# Patient Record
Sex: FEMALE | Race: BLACK OR AFRICAN AMERICAN | Employment: UNEMPLOYED | URBAN - METROPOLITAN AREA
[De-identification: names, ages, dates, MRNs, and addresses within clinical notes are randomized per-mention and may not be internally consistent; named-entity substitution may affect disease eponyms.]

---

## 2017-08-16 PROBLEM — G44.209 MUSCLE CONTRACTION HEADACHE: Status: ACTIVE | Noted: 2017-08-16

## 2017-08-16 PROBLEM — M50.20 CERVICAL DISC DISPLACEMENT: Status: ACTIVE | Noted: 2017-08-16

## 2017-08-16 PROBLEM — M54.12 CERVICAL RADICULOPATHY: Status: ACTIVE | Noted: 2017-08-16

## 2017-10-24 PROBLEM — M48.02 SPINAL STENOSIS IN CERVICAL REGION: Status: ACTIVE | Noted: 2017-10-24

## 2017-10-24 PROBLEM — M25.78 CERVICAL OSTEOPHYTE: Status: ACTIVE | Noted: 2017-10-24

## 2018-01-31 PROBLEM — G45.0 VERTEBROBASILAR TIAS: Status: ACTIVE | Noted: 2018-01-31

## 2019-07-18 ENCOUNTER — INITIAL CONSULT (OUTPATIENT)
Dept: SURGERY | Age: 74
End: 2019-07-18
Payer: MEDICAID

## 2019-07-18 VITALS
WEIGHT: 147 LBS | SYSTOLIC BLOOD PRESSURE: 133 MMHG | DIASTOLIC BLOOD PRESSURE: 74 MMHG | TEMPERATURE: 98.5 F | HEART RATE: 70 BPM | HEIGHT: 61 IN | BODY MASS INDEX: 27.75 KG/M2

## 2019-07-18 DIAGNOSIS — R10.13 EPIGASTRIC PAIN: Primary | ICD-10-CM

## 2019-07-18 PROCEDURE — 99204 OFFICE O/P NEW MOD 45 MIN: CPT | Performed by: SURGERY

## 2019-07-18 RX ORDER — SULFAMETHOXAZOLE AND TRIMETHOPRIM 800; 160 MG/1; MG/1
1 TABLET ORAL 2 TIMES DAILY
COMMUNITY
End: 2021-10-26

## 2019-10-28 ENCOUNTER — TELEPHONE (OUTPATIENT)
Dept: SURGERY | Age: 74
End: 2019-10-28

## 2020-02-28 VITALS
HEART RATE: 79 BPM | DIASTOLIC BLOOD PRESSURE: 90 MMHG | HEIGHT: 62 IN | BODY MASS INDEX: 26.5 KG/M2 | WEIGHT: 144 LBS | SYSTOLIC BLOOD PRESSURE: 132 MMHG

## 2020-02-28 RX ORDER — DORZOLAMIDE HCL 20 MG/ML
1 SOLUTION/ DROPS OPHTHALMIC DAILY
COMMUNITY

## 2020-02-28 RX ORDER — LUBIPROSTONE 24 UG/1
24 CAPSULE, GELATIN COATED ORAL 2 TIMES DAILY WITH MEALS
COMMUNITY

## 2020-02-28 RX ORDER — PANTOPRAZOLE SODIUM 40 MG/1
40 TABLET, DELAYED RELEASE ORAL DAILY
COMMUNITY
End: 2020-12-15

## 2020-02-28 RX ORDER — TIMOLOL MALEATE 5 MG/ML
1 SOLUTION/ DROPS OPHTHALMIC DAILY
COMMUNITY

## 2020-02-28 RX ORDER — LATANOPROST 50 UG/ML
1 SOLUTION/ DROPS OPHTHALMIC NIGHTLY
COMMUNITY

## 2020-02-28 RX ORDER — ALBUTEROL SULFATE 90 UG/1
2 AEROSOL, METERED RESPIRATORY (INHALATION) EVERY 6 HOURS PRN
COMMUNITY

## 2020-02-28 RX ORDER — BRIMONIDINE TARTRATE 2 MG/ML
1 SOLUTION/ DROPS OPHTHALMIC 2 TIMES DAILY
COMMUNITY

## 2020-08-18 RX ORDER — METOPROLOL SUCCINATE 25 MG/1
25 TABLET, EXTENDED RELEASE ORAL DAILY
Qty: 30 TABLET | Refills: 2 | Status: SHIPPED
Start: 2020-08-18 | End: 2020-11-09 | Stop reason: SDUPTHER

## 2020-11-09 RX ORDER — METOPROLOL SUCCINATE 25 MG/1
25 TABLET, EXTENDED RELEASE ORAL DAILY
Qty: 90 TABLET | Refills: 3 | Status: SHIPPED
Start: 2020-11-09 | End: 2022-06-27

## 2020-11-09 RX ORDER — LISINOPRIL 2.5 MG/1
10 TABLET ORAL DAILY
Qty: 90 TABLET | Refills: 3 | Status: SHIPPED
Start: 2020-11-09 | End: 2021-09-01

## 2020-12-10 NOTE — PROGRESS NOTES
Bromide (INCRUSE ELLIPTA) 62.5 MCG/INH AEPB Inhale 62.5 mcg into the lungs daily      timolol (TIMOPTIC) 0.5 % ophthalmic solution Place 1 drop into both eyes daily      brimonidine (ALPHAGAN) 0.2 % ophthalmic solution Place 1 drop into both eyes 2 times daily      dorzolamide (TRUSOPT) 2 % ophthalmic solution Place 1 drop into both eyes daily      albuterol sulfate  (90 Base) MCG/ACT inhaler Inhale 2 puffs into the lungs every 6 hours as needed for Wheezing      latanoprost (XALATAN) 0.005 % ophthalmic solution Place 1 drop into both eyes nightly      lubiprostone (AMITIZA) 24 MCG capsule Take 24 mcg by mouth 2 times daily (with meals)      sulfamethoxazole-trimethoprim (BACTRIM DS;SEPTRA DS) 800-160 MG per tablet Take 1 tablet by mouth 2 times daily      MAPAP 325 MG tablet TK 1 T PO Q 6 H PRN  0    aspirin (ECOTRIN LOW STRENGTH) 81 MG EC tablet Take 1 tablet by mouth 2 times daily 60 tablet 5    ranitidine (ZANTAC) 150 MG tablet TK 1 T PO  BID  0    tiotropium (SPIRIVA HANDIHALER) 18 MCG inhalation capsule Inhale 18 mcg into the lungs every morning.  salmeterol (SEREVENT DISKUS) 50 MCG/DOSE diskus inhaler Inhale 1 puff into the lungs 2 times daily. No current facility-administered medications for this visit.           Allergies as of 12/15/2020 - Review Complete 12/15/2020   Allergen Reaction Noted    Pcn [penicillins] Itching and Swelling 03/26/2012       Social History     Socioeconomic History    Marital status: Single     Spouse name: Not on file    Number of children: Not on file    Years of education: Not on file    Highest education level: Not on file   Occupational History    Not on file   Social Needs    Financial resource strain: Not on file    Food insecurity     Worry: Not on file     Inability: Not on file    Transportation needs     Medical: Not on file     Non-medical: Not on file   Tobacco Use    Smoking status: Current Some Day Smoker     Packs/day: 0.25    Smokeless tobacco: Never Used   Substance and Sexual Activity    Alcohol use: Yes     Comment: every weekend beer 6 pack     Drug use: No    Sexual activity: Not on file   Lifestyle    Physical activity     Days per week: Not on file     Minutes per session: Not on file    Stress: Not on file   Relationships    Social connections     Talks on phone: Not on file     Gets together: Not on file     Attends Rastafari service: Not on file     Active member of club or organization: Not on file     Attends meetings of clubs or organizations: Not on file     Relationship status: Not on file    Intimate partner violence     Fear of current or ex partner: Not on file     Emotionally abused: Not on file     Physically abused: Not on file     Forced sexual activity: Not on file   Other Topics Concern    Not on file   Social History Narrative    Not on file       Family History   Problem Relation Age of Onset    Cancer Mother        REVIEW OF SYSTEMS:     CONSTITUTIONAL:  negative for  fevers, chills, sweats, + fatigue  HEENT:  negative for  tinnitus, earaches, nasal congestion and epistaxis  RESPIRATORY:  negative for  dry cough, cough with sputum, wheezing and hemoptysis  GASTROINTESTINAL:  negative for nausea, vomiting, diarrhea, constipation, pruritus and jaundice  HEMATOLOGIC/LYMPHATIC:  negative for easy bruising, bleeding, lymphadenopathy and petechiae  ENDOCRINE:  negative for heat intolerance, cold intolerance, tremor, hair loss and diabetic symptoms including neither polyuria nor polydipsia nor blurred vision  MUSCULOSKELETAL:  negative for  myalgias, arthralgias, joint swelling, stiff joints and decreased range of motion  NEUROLOGICAL:  negative for memory problems, speech problems, visual disturbance, dysphagia, weakness and numbness      PHYSICAL EXAM:   Constitutional:  Awake, alert cooperative, no apparent distress, and appears stated age.    HEENT:  Moist and pink mucous membranes, normocephalic, without obvious abnormality, atraumatic, normal ears and nose. Neck:  Supple, symmetrical, trachea midline, no JVD, no adenopathy, thyroid symmetric, not enlarged and no tenderness, good carotid upstroke bilaterally, no carotid bruit, skin normal  Lungs: No increased work of breathing, good air exchange, clear to auscultation bilaterally, no crackles or wheezing. Cardiovascular: Normal apical impulse, regular rate and rhythm, normal S1 and S2, no S3 or S4, 3/6 systolic murmur at the apex, 3/6 stock murmur at the left lower sternal border, no pedal edema, good carotid upstroke bilaterally, no carotid bruit, no JVD, no abdominal pulsating masses. Abdomen: Soft, nontender, no hepatomegaly, no splenomegaly, bowel sound positive. CHEST:  Expands symmetrically, nontender to palpation. Musculoskeletal:  No clubbing or cyanosis. No redness, warmth, or swelling of the joints. Neurological: Alert, awake, and oriented X3. /70 (Site: Right Upper Arm, Position: Sitting, Cuff Size: Medium Adult)   Pulse 92   Ht 5' 1.5\" (1.562 m)   Wt 150 lb (68 kg)   BMI 27.88 kg/m²     DATA:   I personally reviewed the visit EKG with the following interpretation: Sinus rhythm    EKG 8/30/2019) I personally reviewed the EKG with the following interpretation: Sinus rhythm    ECHO: 9/11/18 Moderate mitral and tricuspid regurgitation. Mild to moderate pulmonary insufficiency. Moderate sclerosis of the aortic without stenosis Mild asymmetric septal hypertrophy Left ventricular diastolic dysfunction EF 72%    Stress Test: 8/4/17 1. Negative Lexiscan stress test for ischemic symptoms or ischemic EKG changes  2. Normal Cardiolite perfusion scan without evidence of fixed or reversible defect  3. Normal left ventricular systolic function with normal wall motion  4. There is no comparison study  5.   The results of the study predict a low probability for significant coronary artery disease or future cardiac events    Angiography: Cardiology Labs: BMP:    Lab Results   Component Value Date     05/28/2014    K 4.7 05/28/2014     05/28/2014    CO2 23 05/28/2014    BUN 11 05/28/2014    CREATININE 0.9 05/28/2014     CMP:    Lab Results   Component Value Date     05/28/2014    K 4.7 05/28/2014     05/28/2014    CO2 23 05/28/2014    BUN 11 05/28/2014    CREATININE 0.9 05/28/2014    PROT 7.0 08/06/2014     CBC:    Lab Results   Component Value Date    WBC 5.2 08/06/2014    RBC 3.68 08/06/2014    HGB 12.5 08/06/2014    HCT 36.2 08/06/2014    MCV 98.4 08/06/2014    RDW 13.3 08/06/2014     08/06/2014     PT/INR:  No results found for: PTINR  PT/INR Warfarin:  No components found for: PTPATWAR, PTINRWAR  PTT:    Lab Results   Component Value Date    APTT 24.9 08/06/2014     PTT Heparin:  No components found for: APTTHEP  Magnesium:  No results found for: MG  TSH:    Lab Results   Component Value Date    TSH 0.532 05/28/2014     TROPONIN:  No components found for: TROP  BNP:  No results found for: BNP  FASTING LIPID PANEL:    Lab Results   Component Value Date    CHOL 161 04/08/2011    HDL 66.0 04/08/2011    TRIG 95 04/08/2011     NM Cardiac Stress Test Nuclear Imaging    (Results Pending)     I have personally reviewed the laboratory, cardiac diagnostic and radiographic testing as outlined above:      IMPRESSION:  1: Exertional chest pain or shortness of breath: No recent functional ischemic evaluation, will schedule for Lexiscan stress test         2:  Essential (primary) hypertension: Controlled, continue current treatment. 3: Nonrheumatic mitral (valve) insufficiency: Moderate    4: Tricuspid valve regurgitation: Moderate                5: Tobacco abuse patient was counseled regarding smoking cessation                     RECOMMENDATIONS:   1. Lexiscan stress test to rule out CAD  2. Will need echocardiogram if stress test is negative for ischemia  3.   Patient was strongly advised to call 911 if symptoms recur or get worse for any reason  4. Continue current treatment  5. Follow-up with Dr. Etta Brewer as scheduled  6. Follow-up with Dr. Soila Cardozo after her test    I have reviewed my findings and recommendations with patient    Electronically signed by Madhavi Gardiner MD on 12/15/2020 at 1:42 PM    NOTE: This report was transcribed using voice recognition software.  Every effort was made to ensure accuracy; however, inadvertent computerized transcription errors may be present

## 2020-12-15 ENCOUNTER — OFFICE VISIT (OUTPATIENT)
Dept: CARDIOLOGY CLINIC | Age: 75
End: 2020-12-15
Payer: MEDICAID

## 2020-12-15 VITALS
HEIGHT: 62 IN | WEIGHT: 150 LBS | SYSTOLIC BLOOD PRESSURE: 120 MMHG | HEART RATE: 92 BPM | DIASTOLIC BLOOD PRESSURE: 70 MMHG | BODY MASS INDEX: 27.6 KG/M2

## 2020-12-15 PROCEDURE — 99214 OFFICE O/P EST MOD 30 MIN: CPT | Performed by: INTERNAL MEDICINE

## 2020-12-15 PROCEDURE — 93000 ELECTROCARDIOGRAM COMPLETE: CPT | Performed by: INTERNAL MEDICINE

## 2020-12-15 RX ORDER — AMLODIPINE BESYLATE 5 MG/1
TABLET ORAL
COMMUNITY
Start: 2020-11-10 | End: 2020-12-22 | Stop reason: SDUPTHER

## 2020-12-15 RX ORDER — ATORVASTATIN CALCIUM 10 MG/1
TABLET, FILM COATED ORAL
COMMUNITY
Start: 2020-11-10 | End: 2020-12-22 | Stop reason: SDUPTHER

## 2020-12-21 ENCOUNTER — TELEPHONE (OUTPATIENT)
Dept: CARDIOLOGY | Age: 75
End: 2020-12-21

## 2020-12-21 NOTE — TELEPHONE ENCOUNTER
Spoke with patient reminder of appointment on 12/22/20 at 9:30 for a lexiscan stress test instructions and COVID checklist reviewed patient confirmed appointment.

## 2020-12-22 ENCOUNTER — HOSPITAL ENCOUNTER (OUTPATIENT)
Dept: CARDIOLOGY | Age: 75
Discharge: HOME | End: 2020-12-22
Payer: MEDICAID

## 2020-12-22 VITALS
WEIGHT: 150 LBS | DIASTOLIC BLOOD PRESSURE: 81 MMHG | BODY MASS INDEX: 27.6 KG/M2 | TEMPERATURE: 95.5 F | HEIGHT: 62 IN | SYSTOLIC BLOOD PRESSURE: 149 MMHG | RESPIRATION RATE: 18 BRPM | HEART RATE: 68 BPM

## 2020-12-22 PROCEDURE — 2580000003 HC RX 258: Performed by: INTERNAL MEDICINE

## 2020-12-22 PROCEDURE — A9502 TC99M TETROFOSMIN: HCPCS | Performed by: INTERNAL MEDICINE

## 2020-12-22 PROCEDURE — 6360000002 HC RX W HCPCS: Performed by: INTERNAL MEDICINE

## 2020-12-22 PROCEDURE — 78452 HT MUSCLE IMAGE SPECT MULT: CPT

## 2020-12-22 PROCEDURE — 93017 CV STRESS TEST TRACING ONLY: CPT

## 2020-12-22 PROCEDURE — 3430000000 HC RX DIAGNOSTIC RADIOPHARMACEUTICAL: Performed by: INTERNAL MEDICINE

## 2020-12-22 RX ORDER — ATORVASTATIN CALCIUM 10 MG/1
TABLET, FILM COATED ORAL
Qty: 90 TABLET | Refills: 3 | Status: SHIPPED | OUTPATIENT
Start: 2020-12-22

## 2020-12-22 RX ORDER — SODIUM CHLORIDE 0.9 % (FLUSH) 0.9 %
10 SYRINGE (ML) INJECTION PRN
Status: DISCONTINUED | OUTPATIENT
Start: 2020-12-22 | End: 2020-12-23 | Stop reason: HOSPADM

## 2020-12-22 RX ORDER — AMLODIPINE BESYLATE 5 MG/1
TABLET ORAL
Qty: 90 TABLET | Refills: 3 | Status: SHIPPED
Start: 2020-12-22 | End: 2022-08-26 | Stop reason: SDUPTHER

## 2020-12-22 RX ORDER — ASPIRIN 81 MG/1
81 TABLET ORAL 2 TIMES DAILY
Qty: 180 TABLET | Refills: 3 | Status: SHIPPED
Start: 2020-12-22 | End: 2022-07-18

## 2020-12-22 RX ADMIN — SODIUM CHLORIDE, PRESERVATIVE FREE 10 ML: 5 INJECTION INTRAVENOUS at 09:41

## 2020-12-22 RX ADMIN — TETROFOSMIN 34 MILLICURIE: 0.23 INJECTION, POWDER, LYOPHILIZED, FOR SOLUTION INTRAVENOUS at 11:19

## 2020-12-22 RX ADMIN — TETROFOSMIN 11 MILLICURIE: 0.23 INJECTION, POWDER, LYOPHILIZED, FOR SOLUTION INTRAVENOUS at 09:41

## 2020-12-22 RX ADMIN — SODIUM CHLORIDE, PRESERVATIVE FREE 10 ML: 5 INJECTION INTRAVENOUS at 11:19

## 2020-12-22 RX ADMIN — REGADENOSON 0.4 MG: 0.08 INJECTION, SOLUTION INTRAVENOUS at 11:20

## 2020-12-22 NOTE — PROCEDURES
[address] [facility]. Deisy Benedict [address] [place] [address]  [phone]                 Pharmacologic Stress Nuclear Gated SPECT Study    Name: Duong WORK[address] Account Number: [de-identified]    :  1945          Sex: female         Date of Study:  2020    Height: 5' 2\" (157.5 cm)         Weight: 150 lb (68 kg)     Ordering Provider: Pedro Hollins          PCP: Yarelis Espana MD      Cardiologist: Pedro Hollins             Interpreting Physician: Pedro Hollins  _________________________________________________________________________________    Indication:   Detecting the presence and location of coronary artery disease    Clinical History:   Patient has no known history of coronary artery disease. Resting ECG:    MN int 154m sec, QRS int 68m sec, QT int 412m sec; HR 68 bpm  Normal sinus rhythm    Procedure:   Pharmacologic stress testing was performed with regadenoson 0.4 mg for 15 seconds. The heart rate was 68 at baseline and lydia to 102 beats during the infusion. The blood pressure at baseline was 149/81 and blood pressure at the end of infusion was 154/84. Blood pressure response was normal during the stress procedure. The patient tolerated the infusion well. ECG during the infusion did not change. IMAGING: Myocardial perfusion imaging was performed at rest 30-35 minutes following the intravenous injection of 11 mCi of (Tc-tetrofosmin) followed by 10 ml of Normal Saline. As per infusion protocol, the patient was injected intravenously with 34 mCi of (Tc-tetrofosmin) followed by 10 ml of Normal Saline. Gated post-stress tomographic imaging was performed 45 minutes after stress. FINDINGS: The overall quality of the study was good. Left ventricular cavity size was noted to be normal.    Rotational analog analysis demonstrated no patient motion or abnormal extracardiac radioactivity. The gated SPECT stress imaging in the short, vertical long, and horizontal long axis demonstrated normal homogeneous tracer distribution throughout the myocardium both on post regadenoson and resting images. Gated SPECT left ventricular ejection fraction was calculated to be 75 %, with normal myocardial thickening and wall motion. Impression:    1. Electrocardiographically normal regadenoson infusion with a clinically non-ischemic response  2. Myocardial perfusion imaging was normal.    3. Overall left ventricular systolic function was normal without regional wall motion abnormalities. 4. Low risk general pharmacologic stress test.    Thank you for sending your patient to this [facility] [facility].      Electronically signed by Alexei Shin MD on 12/23/20 at 6:21 PM EST

## 2020-12-23 PROCEDURE — 93016 CV STRESS TEST SUPVJ ONLY: CPT | Performed by: INTERNAL MEDICINE

## 2020-12-23 PROCEDURE — 78452 HT MUSCLE IMAGE SPECT MULT: CPT | Performed by: INTERNAL MEDICINE

## 2020-12-23 PROCEDURE — 93018 CV STRESS TEST I&R ONLY: CPT | Performed by: INTERNAL MEDICINE

## 2020-12-29 ENCOUNTER — TELEPHONE (OUTPATIENT)
Dept: CARDIOLOGY CLINIC | Age: 75
End: 2020-12-29

## 2021-09-01 RX ORDER — LISINOPRIL 2.5 MG/1
TABLET ORAL
Qty: 90 TABLET | Refills: 3 | Status: SHIPPED
Start: 2021-09-01 | End: 2022-06-27

## 2021-10-25 NOTE — PROGRESS NOTES
[facility] Cardiology Progress Note  Dr. Dola Siemens      Referring Physician: John Washburn MD  CHIEF COMPLAINT:   Chief Complaint   Patient presents with    Hypertension     Annual.  Patient states she has been having SOB on exertion       HISTORY OF PRESENT ILLNESS:   Mrs Tova Lazaro is a 77year old female with history of SOB and palpitations who is here for follow-up appointment  Occasional lightheadedness and dizziness, mostly orthostatic, sharp left-sided chest pain, comes and goes, shortness of breath is at baseline, denies any pedal edema, no PND, no orthopnea, no syncope, no presyncopal episodes.     Functional capacity is limited due to shortness of breath      Past Medical History:   Diagnosis Date    Asthma     Back pain     Cancer ([facility] [address]     breast in remission    Cigarette nicotine dependence     GERD (gastroesophageal reflux disease)     Headache(784.0)     Headache(784.0)     Hypertension     Liver disease     hepatitis c     Lumbar radiculopathy     Neck pain     Neuropathy, peripheral, idiopathic     Nonrheumatic mitral (valve) insufficiency          Past Surgical History:   Procedure Laterality Date    BREAST LUMPECTOMY      ERCP  8/6/2014    unsuccessful    HYSTERECTOMY      INNER EAR SURGERY      MASTECTOMY Left 2000    SHOULDER SURGERY           Current Outpatient Medications   Medication Sig Dispense Refill    pantoprazole (PROTONIX) 40 MG tablet TAKE 1 TABLET BY MOUTH EVERY DAY      lisinopril (PRINIVIL;ZESTRIL) 2.5 MG tablet TAKE 4 TABLETS BY MOUTH EVERY DAY 90 tablet 3    aspirin (ECOTRIN LOW STRENGTH) 81 MG EC tablet Take 1 tablet by mouth 2 times daily 180 tablet 3    amLODIPine (NORVASC) 5 MG tablet TK 1 T PO QD 90 tablet 3    atorvastatin (LIPITOR) 10 MG tablet TK 1 T PO QD 90 tablet 3    metoprolol succinate (TOPROL XL) 25 MG extended release tablet Take 1 tablet by mouth daily 90 tablet 3    timolol (TIMOPTIC) 0.5 % ophthalmic solution Place 1 drop into both eyes daily      brimonidine (ALPHAGAN) 0.2 % ophthalmic solution Place 1 drop into both eyes 2 times daily      dorzolamide (TRUSOPT) 2 % ophthalmic solution Place 1 drop into both eyes daily      albuterol sulfate  (90 Base) MCG/ACT inhaler Inhale 2 puffs into the lungs every 6 hours as needed for Wheezing      latanoprost (XALATAN) 0.005 % ophthalmic solution Place 1 drop into both eyes nightly      lubiprostone (AMITIZA) 24 MCG capsule Take 24 mcg by mouth 2 times daily (with meals)      MAPAP 325 MG tablet TK 1 T PO Q 6 H PRN  0    tiotropium (SPIRIVA HANDIHALER) 18 MCG inhalation capsule Inhale 18 mcg into the lungs every morning.  salmeterol (SEREVENT DISKUS) 50 MCG/DOSE diskus inhaler Inhale 1 puff into the lungs 2 times daily.  dicyclomine (BENTYL) 10 MG capsule Take 10 mg by mouth 4 times daily (before meals and nightly)      albuterol sulfate HFA (PROAIR HFA) 108 (90 Base) MCG/ACT inhaler Inhale 2 puffs into the lungs every 6 hours as needed for Wheezing 18 g 0     No current facility-administered medications for this visit.          Allergies as of 10/26/2021 - Fully Reviewed 10/26/2021   Allergen Reaction Noted    Pcn [penicillins] Itching and Swelling 03/26/2012       Social History     Socioeconomic History    Marital status: Single     Spouse name: Not on file    Number of children: Not on file    Years of education: Not on file    Highest education level: Not on file   Occupational History    Not on file   Tobacco Use    Smoking status: Former Smoker     Packs/day: 0.25    Smokeless tobacco: Never Used   Vaping Use    Vaping Use: Never used   Substance and Sexual Activity    Alcohol use: Yes     Comment: every weekend beer 6 pack     Drug use: No    Sexual activity: Not on file   Other Topics Concern    Not on file   Social History Narrative    Not on file     Social Determinants of Health     Financial Resource Strain:     Difficulty of Paying Living Expenses: Not on file   Food Insecurity:     Worried About [facility] in the Last Year: Not on file    Victorino of Food in the Last Year: Not on file   Transportation Needs:     Lack of Transportation (Medical): Not on file    Lack of Transportation (Non-Medical):  Not on file   Physical Activity:     Days of Exercise per Week: Not on file    Minutes of Exercise per Session: Not on file   Stress:     Feeling of Stress : Not on file   Social Connections:     Frequency of Communication with Friends and Family: Not on file    Frequency of Social Gatherings with Friends and Family: Not on file    Attends Rastafari Services: Not on file    Active Member of [address] [place] [address] or Organizations: Not on file    Attends Club or Organization Meetings: Not on file    Marital Status: Not on file   Intimate Partner Violence:     Fear of Current or Ex-Partner: Not on file    Emotionally Abused: Not on file    Physically Abused: Not on file    Sexually Abused: Not on file   Housing Stability:     Unable to Pay for Housing in the Last Year: Not on file    Number of [place] in the Last Year: Not on file    Unstable Housing in the Last Year: Not on file       Family History   Problem Relation Age of Onset    Cancer Mother     No Known Problems Father        REVIEW OF SYSTEMS:     CONSTITUTIONAL:  negative for  fevers, chills, sweats, + fatigue  HEENT:  negative for  tinnitus, earaches, nasal congestion and epistaxis  RESPIRATORY:  negative for  dry cough, cough with sputum, wheezing and hemoptysis  GASTROINTESTINAL:  negative for nausea, vomiting, diarrhea, constipation, pruritus and jaundice  HEMATOLOGIC/LYMPHATIC:  negative for easy bruising, bleeding, lymphadenopathy and petechiae  ENDOCRINE:  negative for heat intolerance, cold intolerance, tremor, hair loss and diabetic symptoms including neither polyuria nor polydipsia nor blurred vision  MUSCULOSKELETAL:  negative for  myalgias, arthralgias, joint swelling, stiff joints and decreased range of motion  NEUROLOGICAL:  negative for memory problems, speech problems, visual disturbance, dysphagia, weakness and numbness      PHYSICAL EXAM:   Constitutional:  Awake, alert cooperative, no apparent distress, and appears stated age. HEENT:  Moist and pink mucous membranes, normocephalic, without obvious abnormality, atraumatic, normal ears and nose. Neck:  Supple, symmetrical, trachea midline, no JVD, no adenopathy, thyroid symmetric, not enlarged and no tenderness, good carotid upstroke bilaterally, no carotid bruit, skin normal  Lungs: No increased work of breathing, good air exchange, clear to auscultation bilaterally, no crackles or wheezing. Cardiovascular: Normal apical impulse, regular rate and rhythm, normal S1 and S2, no S3 or S4, 3/6 systolic murmur at the apex, 3/6 stock murmur at the left lower sternal border, no pedal edema, good carotid upstroke bilaterally, no carotid bruit, no JVD, no abdominal pulsating masses. Abdomen: Soft, nontender, no hepatomegaly, no splenomegaly, bowel sound positive. CHEST:  Expands symmetrically, nontender to palpation. Musculoskeletal:  No clubbing or cyanosis. No redness, warmth, or swelling of the joints. Neurological: Alert, awake, and oriented X3. /84 (Site: Right Upper Arm, Position: Sitting, Cuff Size: Medium Adult)   Pulse 95   Ht 5' 2\" (1.575 m)   Wt 155 lb (70.3 kg)   BMI 28.35 kg/m²     DATA:   I personally reviewed the visit EKG with the following interpretation: Sinus rhythm, normal axis, no change when compared to previous    EKG 8/30/2019) I personally reviewed the EKG with the following interpretation:    EKG 12/15/20  Sinus rhythm    ECHO: 9/11/18 Moderate mitral and tricuspid regurgitation. Mild to moderate pulmonary insufficiency.   Moderate sclerosis of the aortic without stenosis Mild asymmetric septal hypertrophy Left ventricular diastolic dysfunction EF 27%    Stress Test: 12/22/20 Impression:   1. Electrocardiographically normal regadenoson infusion with a   clinically non-ischemic response   2. Myocardial perfusion imaging was normal.     3. Overall left ventricular systolic function was normal without   regional wall motion abnormalities. 4.   Low risk general pharmacologic stress test     8/4/17 1. Negative Lexiscan stress test for ischemic symptoms or ischemic EKG changes  2. Normal Cardiolite perfusion scan without evidence of fixed or reversible defect  3. Normal left ventricular systolic function with normal wall motion  4. There is no comparison study  5.   The results of the study predict a low probability for significant coronary artery disease or future cardiac events    Angiography:     Cardiology Labs: BMP:    Lab Results   Component Value Date     05/28/2014    K 4.7 05/28/2014     05/28/2014    CO2 23 05/28/2014    BUN 11 05/28/2014    CREATININE 0.9 05/28/2014     CMP:    Lab Results   Component Value Date     05/28/2014    K 4.7 05/28/2014     05/28/2014    CO2 23 05/28/2014    BUN 11 05/28/2014    CREATININE 0.9 05/28/2014    PROT 7.0 08/06/2014     CBC:    Lab Results   Component Value Date    WBC 5.2 08/06/2014    RBC 3.68 08/06/2014    HGB 12.5 08/06/2014    HCT 36.2 08/06/2014    MCV 98.4 08/06/2014    RDW 13.3 08/06/2014     08/06/2014     PT/INR:  No results found for: PTINR  PT/INR Warfarin:  No components found for: PTPATWAR, PTINRWAR  PTT:    Lab Results   Component Value Date    APTT 24.9 08/06/2014     PTT Heparin:  No components found for: APTTHEP  Magnesium:  No results found for: MG  TSH:    Lab Results   Component Value Date    TSH 0.532 05/28/2014     TROPONIN:  No components found for: TROP  BNP:  No results found for: BNP  FASTING LIPID PANEL:    Lab Results   Component Value Date    CHOL 161 04/08/2011    HDL 66.0 04/08/2011    TRIG 95 04/08/2011     No orders to display     I have personally reviewed the laboratory, cardiac diagnostic and radiographic testing as outlined above:      IMPRESSION:  1: Exertional chest pain or shortness of breath: Chronic, had negative Lexiscan stress test in December 2020 for the same symptoms  2:  Essential (primary) hypertension: Controlled, continue current treatment. 3: Nonrheumatic mitral (valve) insufficiency: Moderate  4: Tricuspid valve regurgitation: Moderate       5: Tobacco abuse: Patient was counseled regarding smoking cessation                     RECOMMENDATIONS:   1. Continue current treatment  2. Patient was strongly advised to call 911 if symptoms recur or get worse for any reason  3. Continue current treatment  4. Follow-up with Dr. Roverto Lai as scheduled  5. Follow-up with Dr. Lani Webber in 1 year, sooner if symptomatic for any reason    I have reviewed my findings and recommendations with patient    Electronically signed by Hugo Abernathy MD on 12/13/2021 at 7:19 PM    NOTE: This report was transcribed using voice recognition software.  Every effort was made to ensure accuracy; however, inadvertent computerized transcription errors may be present

## 2021-10-26 ENCOUNTER — OFFICE VISIT (OUTPATIENT)
Dept: CARDIOLOGY CLINIC | Age: 76
End: 2021-10-26
Payer: MEDICAID

## 2021-10-26 VITALS
SYSTOLIC BLOOD PRESSURE: 124 MMHG | BODY MASS INDEX: 28.52 KG/M2 | HEIGHT: 62 IN | HEART RATE: 95 BPM | WEIGHT: 155 LBS | DIASTOLIC BLOOD PRESSURE: 84 MMHG

## 2021-10-26 DIAGNOSIS — I10 ESSENTIAL HYPERTENSION: Primary | ICD-10-CM

## 2021-10-26 PROCEDURE — 99214 OFFICE O/P EST MOD 30 MIN: CPT | Performed by: INTERNAL MEDICINE

## 2021-10-26 PROCEDURE — 93000 ELECTROCARDIOGRAM COMPLETE: CPT | Performed by: INTERNAL MEDICINE

## 2021-10-26 RX ORDER — PANTOPRAZOLE SODIUM 40 MG/1
TABLET, DELAYED RELEASE ORAL
COMMUNITY
Start: 2021-09-29

## 2021-12-01 ENCOUNTER — HOSPITAL ENCOUNTER (EMERGENCY)
Age: 76
Discharge: HOME | End: 2021-12-01
Attending: EMERGENCY MEDICINE
Payer: MEDICAID

## 2021-12-01 ENCOUNTER — APPOINTMENT (OUTPATIENT)
Dept: GENERAL RADIOLOGY | Age: 76
End: 2021-12-01
Payer: MEDICAID

## 2021-12-01 VITALS
BODY MASS INDEX: 28.32 KG/M2 | HEART RATE: 91 BPM | WEIGHT: 150 LBS | OXYGEN SATURATION: 97 % | HEIGHT: 61 IN | SYSTOLIC BLOOD PRESSURE: 140 MMHG | RESPIRATION RATE: 20 BRPM | TEMPERATURE: 98.1 F | DIASTOLIC BLOOD PRESSURE: 89 MMHG

## 2021-12-01 DIAGNOSIS — J45.909 UNCOMPLICATED ASTHMA, UNSPECIFIED ASTHMA SEVERITY, UNSPECIFIED WHETHER PERSISTENT: ICD-10-CM

## 2021-12-01 DIAGNOSIS — U07.1 COVID-19: Primary | ICD-10-CM

## 2021-12-01 DIAGNOSIS — R06.00 DYSPNEA DUE TO COVID-19: ICD-10-CM

## 2021-12-01 DIAGNOSIS — R91.1 PULMONARY NODULE: ICD-10-CM

## 2021-12-01 DIAGNOSIS — U07.1 DYSPNEA DUE TO COVID-19: ICD-10-CM

## 2021-12-01 LAB — SARS-COV-2, NAAT: DETECTED

## 2021-12-01 PROCEDURE — 71045 X-RAY EXAM CHEST 1 VIEW: CPT

## 2021-12-01 PROCEDURE — 99283 EMERGENCY DEPT VISIT LOW MDM: CPT

## 2021-12-01 PROCEDURE — 87635 SARS-COV-2 COVID-19 AMP PRB: CPT

## 2021-12-01 RX ORDER — DEXAMETHASONE 6 MG/1
6 TABLET ORAL 2 TIMES DAILY WITH MEALS
Qty: 8 TABLET | Refills: 0 | Status: SHIPPED | OUTPATIENT
Start: 2021-12-01 | End: 2021-12-05

## 2021-12-01 RX ORDER — DICYCLOMINE HYDROCHLORIDE 10 MG/1
10 CAPSULE ORAL
COMMUNITY
End: 2022-07-18

## 2021-12-01 RX ORDER — ALBUTEROL SULFATE 90 UG/1
2 AEROSOL, METERED RESPIRATORY (INHALATION) EVERY 6 HOURS PRN
Qty: 18 G | Refills: 0 | Status: SHIPPED | OUTPATIENT
Start: 2021-12-01 | End: 2021-12-08

## 2021-12-01 ASSESSMENT — ENCOUNTER SYMPTOMS
WHEEZING: 0
EYE PAIN: 0
ABDOMINAL PAIN: 0
SINUS PRESSURE: 0
RHINORRHEA: 1
ABDOMINAL DISTENTION: 0
EYE REDNESS: 0
NAUSEA: 0
SORE THROAT: 0
COUGH: 1
EYE DISCHARGE: 0
VOMITING: 0
WHEEZING: 1
CHEST TIGHTNESS: 0
DIARRHEA: 0
SHORTNESS OF BREATH: 0
BACK PAIN: 0

## 2021-12-01 NOTE — ED NOTES
EMT ambulance here to transport pt to [facility] Report given to EMS Pt stable 02 cont via NC Pt AOX3 Pt notified sister of possible   Admission      Adonis Vallejo RN  12/01/21 Lux Myers RN  12/01/21 [id number]

## 2021-12-01 NOTE — ED PROVIDER NOTES
The history is provided by the patient. Illness   The current episode started more than 1 week ago. The onset was gradual. The problem is moderate. Associated symptoms include congestion, headaches, rhinorrhea, muscle aches, cough and wheezing. Pertinent negatives include no fever, no diarrhea, no nausea, no vomiting, no ear pain, no sore throat, no rash, no eye discharge, no eye pain and no eye redness. Review of Systems   Constitutional: Negative for chills and fever. HENT: Positive for congestion and rhinorrhea. Negative for ear pain, sinus pressure and sore throat. Eyes: Negative for pain, discharge and redness. Respiratory: Positive for cough and wheezing. Negative for shortness of breath. Cardiovascular: Negative for chest pain. Gastrointestinal: Negative for abdominal distention, diarrhea, nausea and vomiting. Genitourinary: Negative for dysuria and frequency. Musculoskeletal: Negative for arthralgias and back pain. Skin: Negative for rash and wound. Neurological: Positive for headaches. Negative for weakness. Hematological: Negative for adenopathy. All other systems reviewed and are negative. Physical Exam  Vitals and nursing note reviewed. Constitutional:       Appearance: She is well-developed. HENT:      Head: Normocephalic and atraumatic. Right Ear: Hearing and external ear normal. Tympanic membrane is retracted. Left Ear: Hearing and external ear normal. Tympanic membrane is retracted. Nose: Mucosal edema and congestion present. Mouth/Throat:      Pharynx: Uvula midline. Eyes:      General: Lids are normal.      Conjunctiva/sclera: Conjunctivae normal.      Pupils: Pupils are equal, round, and reactive to light. Cardiovascular:      Rate and Rhythm: Normal rate and regular rhythm. Heart sounds: Normal heart sounds. No murmur heard. Pulmonary:      Effort: Pulmonary effort is normal. No respiratory distress.       Breath sounds: Rhonchi present. No wheezing or rales. Abdominal:      General: Bowel sounds are normal.      Palpations: Abdomen is soft. Abdomen is not rigid. Tenderness: There is no abdominal tenderness. There is no guarding or rebound. Musculoskeletal:      Cervical back: Normal range of motion and neck supple. Skin:     General: Skin is warm and dry. Findings: No abrasion or rash. Neurological:      Mental Status: She is alert and oriented to person, place, and time. GCS: GCS eye subscore is 4. GCS verbal subscore is 5. GCS motor subscore is 6. Cranial Nerves: No cranial nerve deficit. Sensory: No sensory deficit. Coordination: Coordination normal.      Gait: Gait normal.          Procedures     MDM          --------------------------------------------- PAST HISTORY ---------------------------------------------  Past Medical History:  has a past medical history of Asthma, Back pain, Cancer ([facility] [address] Cigarette nicotine dependence, GERD (gastroesophageal reflux disease), Headache(784.0), Headache(784.0), Hypertension, Liver disease, Lumbar radiculopathy, Neck pain, Neuropathy, peripheral, idiopathic, and Nonrheumatic mitral (valve) insufficiency. Past Surgical History:  has a past surgical history that includes Breast lumpectomy; shoulder surgery; Hysterectomy; Inner ear surgery; Mastectomy (Left, 2000); and ERCP (8/6/2014). Social History:  reports that she has quit smoking. She smoked 0.25 packs per day. She has never used smokeless tobacco. She reports current alcohol use. She reports that she does not use drugs. Family History: family history includes Cancer in her mother; No Known Problems in her father. The patients home medications have been reviewed.     Allergies: Pcn [penicillins]    -------------------------------------------------- RESULTS -------------------------------------------------  Labs:  Results for orders placed or performed during the hospital encounter of 12/01/21   COVID-19, Rapid    Specimen: Nasopharyngeal Swab   Result Value Ref Range    SARS-CoV-2, NAAT DETECTED (A) Not Detected       Radiology:  No orders to display       ------------------------- NURSING NOTES AND VITALS REVIEWED ---------------------------  Date / Time Roomed:  12/1/2021  5:43 PM  ED Bed Assignment:  01/01    The nursing notes within the ED encounter and vital signs as below have been reviewed. /80   Pulse 107   Temp 96.9 °F (36.1 °C) (Temporal)   Resp 16   Ht 5' 1\" (1.549 m)   Wt 150 lb (68 kg)   SpO2 93%   BMI 28.34 kg/m²   Oxygen Saturation Interpretation: Abnormal - but at baseline      ------------------------------------------ PROGRESS NOTES ------------------------------------------  I have spoken with the patient and discussed todays results, in addition to providing specific details for the plan of care and counseling regarding the diagnosis and prognosis. Their questions are answered at this time and they are agreeable with the plan. I discussed at length with them reasons for immediate return here for re evaluation. They will followup with primary care by calling their office tomorrow. Case was discussed with Dr. Zabrina Garcia at [facility] who accepted the transfer. Patient will be sent via EMS for further evaluation and treatment and possible admission.      --------------------------------- ADDITIONAL PROVIDER NOTES ---------------------------------  At this time the patient is without objective evidence of an acute process requiring hospitalization or inpatient management. They have remained hemodynamically stable throughout their entire ED visit and are stable for discharge with outpatient follow-up. The plan has been discussed in detail and they are aware of the specific conditions for emergent return, as well as the importance of follow-up.       New Prescriptions    No medications on file     Patient's Medications   New Prescriptions    No medications on file   Previous Medications    ALBUTEROL SULFATE  (90 BASE) MCG/ACT INHALER    Inhale 2 puffs into the lungs every 6 hours as needed for Wheezing    AMLODIPINE (NORVASC) 5 MG TABLET    TK 1 T PO QD    ASPIRIN (ECOTRIN LOW STRENGTH) 81 MG EC TABLET    Take 1 tablet by mouth 2 times daily    ATORVASTATIN (LIPITOR) 10 MG TABLET    TK 1 T PO QD    BRIMONIDINE (ALPHAGAN) 0.2 % OPHTHALMIC SOLUTION    Place 1 drop into both eyes 2 times daily    DICYCLOMINE (BENTYL) 10 MG CAPSULE    Take 10 mg by mouth 4 times daily (before meals and nightly)    DORZOLAMIDE (TRUSOPT) 2 % OPHTHALMIC SOLUTION    Place 1 drop into both eyes daily    LATANOPROST (XALATAN) 0.005 % OPHTHALMIC SOLUTION    Place 1 drop into both eyes nightly    LISINOPRIL (PRINIVIL;ZESTRIL) 2.5 MG TABLET    TAKE 4 TABLETS BY MOUTH EVERY DAY    LUBIPROSTONE (AMITIZA) 24 MCG CAPSULE    Take 24 mcg by mouth 2 times daily (with meals)    MAPAP 325 MG TABLET    TK 1 T PO Q 6 H PRN    METOPROLOL SUCCINATE (TOPROL XL) 25 MG EXTENDED RELEASE TABLET    Take 1 tablet by mouth daily    PANTOPRAZOLE (PROTONIX) 40 MG TABLET    TAKE 1 TABLET BY MOUTH EVERY DAY    SALMETEROL (SEREVENT DISKUS) 50 MCG/DOSE DISKUS INHALER    Inhale 1 puff into the lungs 2 times daily. TIMOLOL (TIMOPTIC) 0.5 % OPHTHALMIC SOLUTION    Place 1 drop into both eyes daily    TIOTROPIUM (SPIRIVA HANDIHALER) 18 MCG INHALATION CAPSULE    Inhale 18 mcg into the lungs every morning. Modified Medications    No medications on file   Discontinued Medications    No medications on file         Diagnosis:  1. COVID-19    2. Dyspnea due to COVID-19    3. Uncomplicated asthma, unspecified asthma severity, unspecified whether persistent        Disposition:  Patient's disposition: Transfer to [facility] Other Emergency Room  Patient's condition is stable.                       Lalo Pizano MD  12/01/21 9711

## 2021-12-02 ENCOUNTER — CARE COORDINATION (OUTPATIENT)
Dept: CARE COORDINATION | Age: 76
End: 2021-12-02

## 2021-12-02 NOTE — ED NOTES
Bed: 03  Expected date:   Expected time:   Means of arrival:   Comments:  1733 Mellissa Landis RN  12/01/21 0600

## 2021-12-02 NOTE — ED PROVIDER NOTES
Chief complaint:  Covid    HPI history provided by the patient and report  Patient here for several days of URI symptoms, 7-10. Has cough and congestion. Denies shortness of breath or chest pain or lightheadedness or syncope. Feels weak and fatigued with poor appetite. No abdominal pain. No vomiting or diarrhea. Not vaccinated against Covid. Found to have Covid today at the urgent care so sent here for evaluation. Denies any shortness of breath. Review of Systems   Constitutional: Positive for activity change, appetite change and fatigue. Negative for chills, diaphoresis and fever. HENT: Positive for congestion and rhinorrhea. Negative for sinus pressure and sore throat. Respiratory: Positive for cough. Negative for chest tightness, shortness of breath and wheezing. Cardiovascular: Negative for chest pain, palpitations and leg swelling. Gastrointestinal: Negative for abdominal pain, diarrhea, nausea and vomiting. Genitourinary: Negative for dysuria, flank pain and frequency. Musculoskeletal: Negative for arthralgias, back pain, gait problem, joint swelling, myalgias, neck pain and neck stiffness. Skin: Negative for rash and wound. Neurological: Negative for dizziness, seizures, syncope, weakness, light-headedness, numbness and headaches. Hematological: Negative for adenopathy. All other systems reviewed and are negative. Physical Exam  Vitals and nursing note reviewed. Constitutional:       General: She is not in acute distress. Appearance: She is well-developed. She is not ill-appearing, toxic-appearing or diaphoretic. HENT:      Head: Normocephalic and atraumatic. Nose: Mucosal edema and congestion present. No rhinorrhea. Mouth/Throat:      Pharynx: Oropharynx is clear. Uvula midline. Comments: No trismus or stridor  Eyes:      Pupils: Pupils are equal, round, and reactive to light. Cardiovascular:      Rate and Rhythm: Normal rate and regular rhythm. Heart sounds: Normal heart sounds. No murmur heard. Pulmonary:      Effort: Pulmonary effort is normal. No respiratory distress. Breath sounds: Normal breath sounds. No stridor, decreased air movement or transmitted upper airway sounds. No decreased breath sounds, wheezing, rhonchi or rales. Comments: Breath sounds are clear and equal bilateral with no respiratory distress with good air exchange, she is 98% on room air with a mask over her face. Chest:      Chest wall: No tenderness. Abdominal:      General: Bowel sounds are normal. There is no distension. Palpations: Abdomen is soft. Tenderness: There is no abdominal tenderness. There is no right CVA tenderness, left CVA tenderness, guarding or rebound. Musculoskeletal:         General: No swelling, tenderness, deformity or signs of injury. Cervical back: Normal range of motion and neck supple. No signs of trauma or rigidity. No pain with movement, spinous process tenderness or muscular tenderness. Normal range of motion. Right lower leg: No edema. Left lower leg: No edema. Comments: No pretibial edema or calf pain bilaterally. Skin:     General: Skin is warm and dry. Coloration: Skin is not jaundiced or pale. Findings: No erythema or rash. Neurological:      General: No focal deficit present. Mental Status: She is alert and oriented to person, place, and time. GCS: GCS eye subscore is 4. GCS verbal subscore is 5. GCS motor subscore is 6. Cranial Nerves: No cranial nerve deficit.       Coordination: Coordination normal.          Procedures     [facility]                --------------------------------------------- PAST HISTORY ---------------------------------------------  Past Medical History:  has a past medical history of Asthma, Back pain, Cancer ([facility] [address] Cigarette nicotine dependence, GERD (gastroesophageal reflux disease), Headache(784.0), Headache(784.0), Hypertension, Liver disease, Lumbar radiculopathy, Neck pain, Neuropathy, peripheral, idiopathic, and Nonrheumatic mitral (valve) insufficiency. Past Surgical History:  has a past surgical history that includes Breast lumpectomy; shoulder surgery; Hysterectomy; Inner ear surgery; Mastectomy (Left, 2000); and ERCP (8/6/2014). Social History:  reports that she has quit smoking. She smoked 0.25 packs per day. She has never used smokeless tobacco. She reports current alcohol use. She reports that she does not use drugs. Family History: family history includes Cancer in her mother; No Known Problems in her father. The patients home medications have been reviewed. Allergies: Pcn [penicillins]    -------------------------------------------------- RESULTS -------------------------------------------------  Labs:  Results for orders placed or performed during the hospital encounter of 12/01/21   COVID-19, Rapid    Specimen: Nasopharyngeal Swab   Result Value Ref Range    SARS-CoV-2, NAAT DETECTED (A) Not Detected       Radiology:  XR CHEST PORTABLE   Final Result   1. No acute cardiopulmonary abnormality. 2. Partially obscured nodular density at the left lung base, projecting along   the left anterior 7th rib. CT of the chest recommended for further   evaluation.             ------------------------- NURSING NOTES AND VITALS REVIEWED ---------------------------  Date / Time Roomed:  12/1/2021  5:43 PM  ED Bed Assignment:  03/03    The nursing notes within the ED encounter and vital signs as below have been reviewed.    BP (!) 140/89   Pulse 91   Temp 98.1 °F (36.7 °C)   Resp 20   Ht 5' 1\" (1.549 m)   Wt 150 lb (68 kg)   SpO2 97%   BMI 28.34 kg/m²   Oxygen Saturation Interpretation: Normal      ------------------------------------------ PROGRESS NOTES ------------------------------------------  I have spoken with the patient and discussed todays results, in addition to providing specific details for the plan of care and counseling regarding the diagnosis and prognosis. Their questions are answered at this time and they are agreeable with the plan. I discussed at length with them reasons for immediate return here for re evaluation. They will followup with primary care by calling their office tomorrow. --------------------------------- ADDITIONAL PROVIDER NOTES ---------------------------------  At this time the patient is without objective evidence of an acute process requiring hospitalization or inpatient management. They have remained hemodynamically stable throughout their entire ED visit and are stable for discharge with outpatient follow-up. The plan has been discussed in detail and they are aware of the specific conditions for emergent return, as well as the importance of follow-up. New Prescriptions    ALBUTEROL SULFATE HFA (PROAIR HFA) 108 (90 BASE) MCG/ACT INHALER    Inhale 2 puffs into the lungs every 6 hours as needed for Wheezing    DEXAMETHASONE (DECADRON) 6 MG TABLET    Take 1 tablet by mouth 2 times daily (with meals) for 4 days       Diagnosis:  1. COVID-19    2. Dyspnea due to COVID-19    3. Uncomplicated asthma, unspecified asthma severity, unspecified whether persistent    4. Pulmonary nodule        Disposition:  Patient's disposition: Discharge to home  Patient's condition is stable.          Kt Everett, DO  12/01/21 [address] [id number], DO  12/01/21 2338

## 2021-12-02 NOTE — CARE COORDINATION
Patient contacted regarding COVID-19 diagnosis. Discussed COVID-19 related testing which was available at this time. Test results were positive. Patient informed of results, if available? Yes. Ambulatory Care Manager contacted the patient by telephone to perform post discharge assessment. Call within 2 business days of discharge: Yes. Verified name and  with patient as identifiers. Provided introduction to self, and explanation of the CTN/ACM role, and reason for call due to risk factors for infection and/or exposure to COVID-19. Symptoms reviewed with patient who verbalized the following symptoms: pain or aching joints, cough, shortness of breath, no new symptoms and no worsening symptoms. Due to no new or worsening symptoms encounter was not routed to provider for escalation. Discussed follow-up appointments. If no appointment was previously scheduled, appointment scheduling offered: Yes. [facility] follow up appointment(s): No future appointments. Non-[facility] follow up appointment(s): n/a    Non-face-to-face services provided:  Obtained and reviewed discharge summary and/or continuity of care documents     Advance Care Planning:   Does patient have an Advance Directive:  reviewed and current. Educated patient about risk for severe COVID-19 due to risk factors according to CDC guidelines. ACM reviewed discharge instructions, medical action plan and red flag symptoms with the patient who verbalized understanding. Discussed COVID vaccination status: Yes. Education provided on COVID-19 vaccination as appropriate. Discussed exposure protocols and quarantine with CDC Guidelines. Patient was given an opportunity to verbalize any questions and concerns and agrees to contact ACM or health care provider for questions related to their healthcare. Reviewed and educated patient on any new and changed medications related to discharge diagnosis     Was patient discharged with a pulse oximeter?  No Discussed and confirmed pulse oximeter discharge instructions and when to notify provider or seek emergency care. ACM provided contact information. No further follow-up call identified based on severity of symptoms and risk factors. Patient understands CDC guidelines and is able to repeat them. Patient verbalizes understanding of suggestions for follow up from ED AVS and has number to do so  Patient has no new or worsening symptoms. Patient wishes no further calls at this time from [employer] [facility]. Patient has contact information and encouraged to contact ACM should there be any questions or concerns. ACM suggested pt call the ER to have script sent to [facility] so they can deliver it.   ACM to ask  to assist pt with making a follow up appt with PCP

## 2021-12-03 NOTE — CARE COORDINATION
Attempted to call Dr. Kristi Cota office again and still could not get call to go through. Will attempt one more tamela on Monday and if no luck will notify patient.

## 2021-12-06 ENCOUNTER — CARE COORDINATION (OUTPATIENT)
Dept: CARE COORDINATION | Age: 76
End: 2021-12-06

## 2021-12-06 NOTE — CARE COORDINATION
contacted patient to see if there is another phone number to reach Dr. Fran Perera office to schedule hospital follow up. Patient states she already called Dr. Fran Perera office this morning and informed the  that she is COVID positive. The  told patient that she will notify Dr. Afshan Osorio and if he would like her to schedule a follow up appointment that they will call her back to schedule.

## 2022-06-26 NOTE — PROGRESS NOTES
[facility] Progress Note  Dr. Rodger Butler      Referring Physician: Bill Gregorio MD  CHIEF COMPLAINT:   Chief Complaint   Patient presents with    Heart Problem     Per [facility], ABN echo, patient c/o SOB and lightheadedness        HISTORY OF PRESENT ILLNESS:   Mrs Fernando Villarreal is a 77year old female with history of SOB and palpitations who is here for follow-up appointment  Occasional lightheadedness and dizziness, orthostatic, patient denies any chest pain, no shortness of breath, no palpitations, no pedal edema, no PND, no orthopnea, no syncope, no presyncopal episodes.     Functional capacity is limited due to back pain    Past Medical History:   Diagnosis Date    Asthma     Back pain     Cancer (HCC)     breast in remission    Cigarette nicotine dependence     GERD (gastroesophageal reflux disease)     Headache(784.0)     Headache(784.0)     Hypertension     Liver disease     hepatitis c     Lumbar radiculopathy     Neck pain     Neuropathy, peripheral, idiopathic     Nonrheumatic mitral (valve) insufficiency          Past Surgical History:   Procedure Laterality Date    BREAST LUMPECTOMY      ERCP  8/6/2014    unsuccessful    HYSTERECTOMY (CERVIX STATUS UNKNOWN)      INNER EAR SURGERY      MASTECTOMY Left 2000    SHOULDER SURGERY           Current Outpatient Medications   Medication Sig Dispense Refill    Sennosides 8.6 MG CAPS Take 2 capsules by mouth      Cholecalciferol 1.25 MG (25472 UT) TABS TAKE ONE TABLET EVERY DAY      dicyclomine (BENTYL) 10 MG capsule Take 10 mg by mouth 4 times daily (before meals and nightly)      pantoprazole (PROTONIX) 40 MG tablet TAKE 1 TABLET BY MOUTH EVERY DAY      aspirin (ECOTRIN LOW STRENGTH) 81 MG EC tablet Take 1 tablet by mouth 2 times daily 180 tablet 3    amLODIPine (NORVASC) 5 MG tablet TK 1 T PO QD 90 tablet 3    atorvastatin (LIPITOR) 10 MG tablet TK 1 T PO QD 90 tablet 3    brimonidine (ALPHAGAN) 0.2 % ophthalmic solution Place 1 drop into both eyes 2 times daily      dorzolamide (TRUSOPT) 2 % ophthalmic solution Place 1 drop into both eyes daily      albuterol sulfate  (90 Base) MCG/ACT inhaler Inhale 2 puffs into the lungs every 6 hours as needed for Wheezing      latanoprost (XALATAN) 0.005 % ophthalmic solution Place 1 drop into both eyes nightly      MAPAP 325 MG tablet TK 1 T PO Q 6 H PRN  0    albuterol sulfate HFA (PROAIR HFA) 108 (90 Base) MCG/ACT inhaler Inhale 2 puffs into the lungs every 6 hours as needed for Wheezing 18 g 0    lisinopril (PRINIVIL;ZESTRIL) 2.5 MG tablet TAKE 4 TABLETS BY MOUTH EVERY DAY (Patient not taking: Reported on 6/27/2022) 90 tablet 3    metoprolol succinate (TOPROL XL) 25 MG extended release tablet Take 1 tablet by mouth daily (Patient not taking: Reported on 6/27/2022) 90 tablet 3    timolol (TIMOPTIC) 0.5 % ophthalmic solution Place 1 drop into both eyes daily (Patient not taking: Reported on 6/27/2022)      lubiprostone (AMITIZA) 24 MCG capsule Take 24 mcg by mouth 2 times daily (with meals) (Patient not taking: Reported on 6/27/2022)      tiotropium (SPIRIVA HANDIHALER) 18 MCG inhalation capsule Inhale 18 mcg into the lungs every morning. (Patient not taking: Reported on 6/27/2022)      salmeterol (SEREVENT DISKUS) 50 MCG/DOSE diskus inhaler Inhale 1 puff into the lungs 2 times daily. (Patient not taking: Reported on 6/27/2022)       No current facility-administered medications for this visit.          Allergies as of 06/27/2022 - Fully Reviewed 06/27/2022   Allergen Reaction Noted    Pcn [penicillins] Itching and Swelling 03/26/2012       Social History     Socioeconomic History    Marital status: Single     Spouse name: Not on file    Number of children: Not on file    Years of education: Not on file    Highest education level: Not on file   Occupational History    Not on file   Tobacco Use    Smoking status: Current Every Day Smoker     Packs/day: 0.25     Types: Cigarettes    Smokeless tobacco: Never Used   Vaping Use    Vaping Use: Never used   Substance and Sexual Activity    Alcohol use: Yes     Comment: every weekend beer 6 pack     Drug use: No    Sexual activity: Not on file   Other Topics Concern    Not on file   Social History Narrative    Not on file     Social Determinants of Health     Financial Resource Strain:     Difficulty of Paying Living Expenses: Not on file   Food Insecurity:     Worried About Running Out of Food in the Last Year: Not on file    Victorino of Food in the Last Year: Not on file   Transportation Needs:     Lack of Transportation (Medical): Not on file    Lack of Transportation (Non-Medical):  Not on file   Physical Activity:     Days of Exercise per Week: Not on file    Minutes of Exercise per Session: Not on file   Stress:     Feeling of Stress : Not on file   Social Connections:     Frequency of Communication with Friends and Family: Not on file    Frequency of Social Gatherings with Friends and Family: Not on file    Attends Confucianism Services: Not on file    Active Member of [address] [place] [address] or Organizations: Not on file    Attends Club or Organization Meetings: Not on file    Marital Status: Not on file   Intimate Partner Violence:     Fear of Current or Ex-Partner: Not on file    Emotionally Abused: Not on file    Physically Abused: Not on file    Sexually Abused: Not on file   Housing Stability:     Unable to Pay for Housing in the Last Year: Not on file    Number of [place] in the Last Year: Not on file    Unstable Housing in the Last Year: Not on file       Family History   Problem Relation Age of Onset    Cancer Mother     No Known Problems Father        REVIEW OF SYSTEMS:     CONSTITUTIONAL:  negative for  fevers, chills, sweats, + fatigue  HEENT:  negative for  tinnitus, earaches, nasal congestion and epistaxis  RESPIRATORY:  negative for  dry cough, cough with sputum, wheezing and hemoptysis  GASTROINTESTINAL:  negative for nausea, vomiting, diarrhea, constipation, pruritus and jaundice  HEMATOLOGIC/LYMPHATIC:  negative for easy bruising, bleeding, lymphadenopathy and petechiae  ENDOCRINE:  negative for heat intolerance, cold intolerance, tremor, hair loss and diabetic symptoms including neither polyuria nor polydipsia nor blurred vision  MUSCULOSKELETAL:  negative for  myalgias, arthralgias, joint swelling, stiff joints and decreased range of motion  NEUROLOGICAL:  negative for memory problems, speech problems, visual disturbance, dysphagia, weakness and numbness      PHYSICAL EXAM:   Constitutional:  Awake, alert cooperative, no apparent distress, and appears stated age. HEENT:  Moist and pink mucous membranes, normocephalic, without obvious abnormality, atraumatic, normal ears and nose. Neck:  Supple, symmetrical, trachea midline, no JVD, no adenopathy, thyroid symmetric, not enlarged and no tenderness, good carotid upstroke bilaterally, no carotid bruit, skin normal  Lungs: No increased work of breathing, good air exchange, clear to auscultation bilaterally, no crackles or wheezing. Cardiovascular: Normal apical impulse, regular rate and rhythm, normal S1 and S2, no S3 or S4, 3/6 systolic murmur at the apex, 3/6 stock murmur at the left lower sternal border, no pedal edema, good carotid upstroke bilaterally, no carotid bruit, no JVD, no abdominal pulsating masses. Abdomen: Soft, nontender, no hepatomegaly, no splenomegaly, bowel sound positive. CHEST:  Expands symmetrically, nontender to palpation. Musculoskeletal: Back pain  Neurological: Alert, awake, and oriented X3.        /64   Pulse 92   Resp 16   Ht 5' 1\" (1.549 m)   Wt 147 lb (66.7 kg)   BMI 27.78 kg/m²     DATA:   I personally reviewed the visit EKG with the following interpretation: Sinus rhythm, PACs, normal axis,    EKG 10/26/21 Sinus rhythm, normal axis, no change when compared to previous    ECHO: 9/11/18 Moderate mitral and tricuspid regurgitation. Mild to moderate pulmonary insufficiency. Moderate sclerosis of the aortic without stenosis Mild asymmetric septal hypertrophy Left ventricular diastolic dysfunction EF 74%    Stress Test: 12/22/20 Impression:     1. Electrocardiographically normal regadenoson infusion with a   clinically non-ischemic response   2. Myocardial perfusion imaging was normal.     3. Overall left ventricular systolic function was normal without   regional wall motion abnormalities. 4.   Low risk general pharmacologic stress test     8/4/17 1. Negative Lexiscan stress test for ischemic symptoms or ischemic EKG changes  2. Normal Cardiolite perfusion scan without evidence of fixed or reversible defect  3. Normal left ventricular systolic function with normal wall motion  4. There is no comparison study  5.   The results of the study predict a low probability for significant coronary artery disease or future cardiac events    Angiography:     Cardiology Labs: BMP:    Lab Results   Component Value Date     05/28/2014    K 4.7 05/28/2014     05/28/2014    CO2 23 05/28/2014    BUN 11 05/28/2014    CREATININE 0.9 05/28/2014     CMP:    Lab Results   Component Value Date     05/28/2014    K 4.7 05/28/2014     05/28/2014    CO2 23 05/28/2014    BUN 11 05/28/2014    CREATININE 0.9 05/28/2014    PROT 7.0 08/06/2014     CBC:    Lab Results   Component Value Date    WBC 5.2 08/06/2014    RBC 3.68 08/06/2014    HGB 12.5 08/06/2014    HCT 36.2 08/06/2014    MCV 98.4 08/06/2014    RDW 13.3 08/06/2014     08/06/2014     PT/INR:  No results found for: PTINR  PT/INR Warfarin:  No components found for: PTPATWAR, PTINRWAR  PTT:    Lab Results   Component Value Date    APTT 24.9 08/06/2014     PTT Heparin:  No components found for: APTTHEP  Magnesium:  No results found for: MG  TSH:    Lab Results   Component Value Date    TSH 0.532 05/28/2014     TROPONIN:  No components found for: TROP  BNP:  No results found for: BNP  FASTING LIPID PANEL:    Lab Results   Component Value Date    CHOL 161 04/08/2011    HDL 66.0 04/08/2011    TRIG 95 04/08/2011     No orders to display     I have personally reviewed the laboratory, cardiac diagnostic and radiographic testing as outlined above:      IMPRESSION:  1: Shortness of breath: At baseline  2: Hypertension: Controlled, continue current treatment. 3: Mitral valve regurgitation: Moderate  4: Tricuspid valve regurgitation: Moderate       5: Tobacco abuse: Patient was counseled regarding smoking cessation                     RECOMMENDATIONS:   1. Continue current treatment  2. Patient was strongly advised to call 911 if symptoms recur or get worse for any reason  3. Continue current treatment  4. Follow-up with Dr. Ketan Lackey as scheduled  5. Follow-up with Dr. Nick Adler in 1 year, sooner if symptomatic for any reason    I have reviewed my findings and recommendations with patient    Electronically signed by Sheryn Duverney, MD on 6/27/2022 at 10:28 AM    NOTE: This report was transcribed using voice recognition software.  Every effort was made to ensure accuracy; however, inadvertent computerized transcription errors may be present

## 2022-06-27 ENCOUNTER — OFFICE VISIT (OUTPATIENT)
Dept: CARDIOLOGY CLINIC | Age: 77
End: 2022-06-27
Payer: MEDICAID

## 2022-06-27 VITALS
BODY MASS INDEX: 27.75 KG/M2 | HEART RATE: 92 BPM | SYSTOLIC BLOOD PRESSURE: 116 MMHG | WEIGHT: 147 LBS | DIASTOLIC BLOOD PRESSURE: 64 MMHG | HEIGHT: 61 IN | RESPIRATION RATE: 16 BRPM

## 2022-06-27 DIAGNOSIS — R07.9 CHEST PAIN, UNSPECIFIED TYPE: Primary | ICD-10-CM

## 2022-06-27 PROCEDURE — 93000 ELECTROCARDIOGRAM COMPLETE: CPT | Performed by: INTERNAL MEDICINE

## 2022-06-27 PROCEDURE — 1123F ACP DISCUSS/DSCN MKR DOCD: CPT | Performed by: INTERNAL MEDICINE

## 2022-06-27 PROCEDURE — 99213 OFFICE O/P EST LOW 20 MIN: CPT | Performed by: INTERNAL MEDICINE

## 2022-07-18 RX ORDER — DICYCLOMINE HYDROCHLORIDE 10 MG/1
CAPSULE ORAL
Qty: 60 CAPSULE | Refills: 2 | Status: SHIPPED | OUTPATIENT
Start: 2022-07-18

## 2022-07-18 RX ORDER — ASPIRIN 81 MG/1
TABLET, DELAYED RELEASE ORAL
Qty: 180 TABLET | Refills: 0 | Status: SHIPPED | OUTPATIENT
Start: 2022-07-18

## 2022-08-26 RX ORDER — AMLODIPINE BESYLATE 5 MG/1
TABLET ORAL
Qty: 90 TABLET | Refills: 3 | Status: SHIPPED | OUTPATIENT
Start: 2022-08-26

## 2023-08-08 ENCOUNTER — OFFICE VISIT (OUTPATIENT)
Dept: CARDIOLOGY CLINIC | Age: 78
End: 2023-08-08
Payer: MEDICAID

## 2023-08-08 VITALS
RESPIRATION RATE: 16 BRPM | SYSTOLIC BLOOD PRESSURE: 124 MMHG | DIASTOLIC BLOOD PRESSURE: 70 MMHG | BODY MASS INDEX: 27 KG/M2 | HEIGHT: 61 IN | WEIGHT: 143 LBS | HEART RATE: 98 BPM

## 2023-08-08 DIAGNOSIS — I34.0 NONRHEUMATIC MITRAL VALVE REGURGITATION: ICD-10-CM

## 2023-08-08 DIAGNOSIS — R07.9 CHEST PAIN, UNSPECIFIED TYPE: Primary | ICD-10-CM

## 2023-08-08 DIAGNOSIS — R06.02 SHORTNESS OF BREATH: ICD-10-CM

## 2023-08-08 PROCEDURE — 93000 ELECTROCARDIOGRAM COMPLETE: CPT | Performed by: INTERNAL MEDICINE

## 2023-08-08 PROCEDURE — 99214 OFFICE O/P EST MOD 30 MIN: CPT | Performed by: INTERNAL MEDICINE

## 2023-08-08 PROCEDURE — 1123F ACP DISCUSS/DSCN MKR DOCD: CPT | Performed by: INTERNAL MEDICINE

## 2023-08-08 NOTE — PROGRESS NOTES
with Dr. Theresa Guillen after her test    I have reviewed my findings and recommendations with patient    Electronically signed by Easton Larsen MD on 8/8/2023 at 1:06 PM    NOTE: This report was transcribed using voice recognition software.  Every effort was made to ensure accuracy; however, inadvertent computerized transcription errors may be present

## 2023-10-10 ENCOUNTER — HOSPITAL ENCOUNTER (OUTPATIENT)
Dept: CARDIOLOGY | Age: 78
Discharge: HOME | End: 2023-10-12
Attending: INTERNAL MEDICINE
Payer: MEDICAID

## 2023-10-10 DIAGNOSIS — I34.0 NONRHEUMATIC MITRAL VALVE REGURGITATION: ICD-10-CM

## 2023-10-10 DIAGNOSIS — R06.02 SHORTNESS OF BREATH: ICD-10-CM

## 2023-10-10 PROCEDURE — 93306 TTE W/DOPPLER COMPLETE: CPT

## 2023-12-29 RX ORDER — ASPIRIN 81 MG/1
TABLET ORAL
Qty: 180 TABLET | Refills: 0 | Status: SHIPPED | OUTPATIENT
Start: 2023-12-29

## 2023-12-29 RX ORDER — DICYCLOMINE HYDROCHLORIDE 10 MG/1
CAPSULE ORAL
Qty: 60 CAPSULE | Refills: 2 | Status: SHIPPED | OUTPATIENT
Start: 2023-12-29

## 2023-12-29 RX ORDER — AMLODIPINE BESYLATE 5 MG/1
TABLET ORAL
Qty: 90 TABLET | Refills: 3 | Status: SHIPPED | OUTPATIENT
Start: 2023-12-29

## 2024-06-20 RX ORDER — AMLODIPINE BESYLATE 5 MG/1
TABLET ORAL
Qty: 180 TABLET | Refills: 3 | Status: SHIPPED | OUTPATIENT
Start: 2024-06-20

## 2024-06-20 RX ORDER — DICYCLOMINE HYDROCHLORIDE 10 MG/1
CAPSULE ORAL
Qty: 60 CAPSULE | Refills: 3 | Status: SHIPPED | OUTPATIENT
Start: 2024-06-20

## 2024-06-20 RX ORDER — ASPIRIN 81 MG/1
TABLET ORAL
Qty: 180 TABLET | Refills: 3 | Status: SHIPPED | OUTPATIENT
Start: 2024-06-20

## 2024-06-20 RX ORDER — ATORVASTATIN CALCIUM 10 MG/1
TABLET, FILM COATED ORAL
Qty: 90 TABLET | Refills: 3 | Status: SHIPPED | OUTPATIENT
Start: 2024-06-20

## 2024-08-19 ENCOUNTER — OFFICE VISIT (OUTPATIENT)
Dept: CARDIOLOGY CLINIC | Age: 79
End: 2024-08-19
Payer: MEDICAID

## 2024-08-19 VITALS
SYSTOLIC BLOOD PRESSURE: 112 MMHG | HEART RATE: 108 BPM | HEIGHT: 61 IN | DIASTOLIC BLOOD PRESSURE: 54 MMHG | BODY MASS INDEX: 26.81 KG/M2 | WEIGHT: 142 LBS | RESPIRATION RATE: 16 BRPM

## 2024-08-19 DIAGNOSIS — I34.0 NONRHEUMATIC MITRAL VALVE REGURGITATION: Primary | ICD-10-CM

## 2024-08-19 PROCEDURE — 99213 OFFICE O/P EST LOW 20 MIN: CPT | Performed by: INTERNAL MEDICINE

## 2024-08-19 PROCEDURE — 1123F ACP DISCUSS/DSCN MKR DOCD: CPT | Performed by: INTERNAL MEDICINE

## 2024-08-19 PROCEDURE — 93000 ELECTROCARDIOGRAM COMPLETE: CPT | Performed by: INTERNAL MEDICINE

## 2024-08-19 RX ORDER — METOPROLOL SUCCINATE 25 MG/1
25 TABLET, EXTENDED RELEASE ORAL DAILY
Qty: 90 TABLET | Refills: 2 | Status: SHIPPED | OUTPATIENT
Start: 2024-08-19

## 2024-08-19 NOTE — PROGRESS NOTES
Shira Spann  See if she can come in to the office next week  Make it work  Have her try otc omeprazole at doses of 20 to 40 mg once daily  If her symptoms worsen let me know immediately  I would prefer to do a 30 day monitor is she willing to do this?    If right upper quadrant pain or issues, new or worsening symptoms call asap [facility] Cardiology Progress Note  Dr. Salty Mariee      Referring Physician: Ty Panda MD  CHIEF COMPLAINT:   Chief Complaint   Patient presents with    1 Year Follow Up       HISTORY OF PRESENT ILLNESS:   Patient is a 78 year old female with history of SOB and palpitations who is here for follow-up appointment  occasional pedal edema, occasional dizziness, shortness of breath is at baseline,  patient denies any chest pain, no PND, no orthopnea, no syncope, no presyncopal episodes.  Functional capacity is limited due to back pain  + Fatigue    Past Medical History:   Diagnosis Date    Asthma     Back pain     Cancer (HCC)     breast in remission    Cigarette nicotine dependence     GERD (gastroesophageal reflux disease)     Headache(784.0)     Headache(784.0)     Hypertension     Liver disease     hepatitis c     Lumbar radiculopathy     Neck pain     Neuropathy, peripheral, idiopathic     Nonrheumatic mitral (valve) insufficiency          Past Surgical History:   Procedure Laterality Date    BREAST LUMPECTOMY      ERCP  8/6/2014    unsuccessful    HYSTERECTOMY (CERVIX STATUS UNKNOWN)      INNER EAR SURGERY      MASTECTOMY Left 2000    SHOULDER SURGERY           Current Outpatient Medications   Medication Sig Dispense Refill    aspirin (HM ASPIRIN EC LOW DOSE) 81 MG EC tablet TAKE 1 TABLET BY MOUTH 2 TIMES A DAY WITH FOOD LF 9/10/21 180 tablet 3    amLODIPine (NORVASC) 5 MG tablet TK 1 T PO  tablet 3    atorvastatin (LIPITOR) 10 MG tablet TK 1 T PO QD 90 tablet 3    dicyclomine (BENTYL) 10 MG capsule TAKE 1 CAPSULE BY MOUTH 2 TIMES A DAY AS NEEDED FOR ABDOMINAL PAIN 60 capsule 3    vitamin D (CHOLECALCIFEROL) 50 MCG (2000 UT) CAPS capsule daily      brimonidine (ALPHAGAN) 0.2 % ophthalmic solution Place 1 drop into both eyes 2 times daily      dorzolamide (TRUSOPT) 2 % ophthalmic solution Place 1 drop into both eyes daily      albuterol sulfate  (90 Base) MCG/ACT inhaler Inhale 2 puffs into the

## 2025-01-28 ENCOUNTER — HOSPITAL ENCOUNTER (EMERGENCY)
Age: 80
Discharge: HOME | End: 2025-01-28
Attending: EMERGENCY MEDICINE
Payer: MEDICAID

## 2025-01-28 ENCOUNTER — APPOINTMENT (OUTPATIENT)
Dept: GENERAL RADIOLOGY | Age: 80
End: 2025-01-28
Payer: MEDICAID

## 2025-01-28 VITALS
OXYGEN SATURATION: 98 % | TEMPERATURE: 98.1 F | HEART RATE: 85 BPM | BODY MASS INDEX: 23.62 KG/M2 | SYSTOLIC BLOOD PRESSURE: 137 MMHG | WEIGHT: 125 LBS | RESPIRATION RATE: 16 BRPM | DIASTOLIC BLOOD PRESSURE: 78 MMHG

## 2025-01-28 DIAGNOSIS — R53.83 FATIGUE, UNSPECIFIED TYPE: Primary | ICD-10-CM

## 2025-01-28 DIAGNOSIS — N39.0 URINARY TRACT INFECTION WITHOUT HEMATURIA, SITE UNSPECIFIED: ICD-10-CM

## 2025-01-28 LAB
ALBUMIN SERPL-MCNC: 4.1 G/DL (ref 3.5–5.2)
ALP SERPL-CCNC: 57 U/L (ref 35–104)
ALT SERPL-CCNC: 10 U/L (ref 0–32)
ANION GAP SERPL CALCULATED.3IONS-SCNC: 11 MMOL/L (ref 7–16)
AST SERPL-CCNC: 19 U/L (ref 0–31)
BACTERIA URNS QL MICRO: ABNORMAL
BASOPHILS # BLD: 0.03 K/UL (ref 0–0.2)
BASOPHILS NFR BLD: 1 % (ref 0–2)
BILIRUB SERPL-MCNC: 0.7 MG/DL (ref 0–1.2)
BILIRUB UR QL STRIP: NEGATIVE
BUN SERPL-MCNC: 9 MG/DL (ref 6–23)
CALCIUM SERPL-MCNC: 9.9 MG/DL (ref 8.6–10.2)
CHLORIDE SERPL-SCNC: 104 MMOL/L (ref 98–107)
CLARITY UR: ABNORMAL
CO2 SERPL-SCNC: 27 MMOL/L (ref 22–29)
COLOR UR: YELLOW
CREAT SERPL-MCNC: 1 MG/DL (ref 0.5–1)
EKG ATRIAL RATE: 96 BPM
EKG P AXIS: 45 DEGREES
EKG P-R INTERVAL: 138 MS
EKG Q-T INTERVAL: 364 MS
EKG QRS DURATION: 66 MS
EKG QTC CALCULATION (BAZETT): 459 MS
EKG R AXIS: 39 DEGREES
EKG T AXIS: 70 DEGREES
EKG VENTRICULAR RATE: 96 BPM
EOSINOPHIL # BLD: 0.12 K/UL (ref 0.05–0.5)
EOSINOPHILS RELATIVE PERCENT: 2 % (ref 0–6)
EPI CELLS #/AREA URNS HPF: ABNORMAL /HPF
ERYTHROCYTE [DISTWIDTH] IN BLOOD BY AUTOMATED COUNT: 13.2 % (ref 11.5–15)
FLUAV RNA RESP QL NAA+PROBE: NOT DETECTED
FLUBV RNA RESP QL NAA+PROBE: NOT DETECTED
GFR, ESTIMATED: 56 ML/MIN/1.73M2
GLUCOSE SERPL-MCNC: 96 MG/DL (ref 74–99)
GLUCOSE UR STRIP-MCNC: NEGATIVE MG/DL
HCT VFR BLD AUTO: 38.9 % (ref 34–48)
HGB BLD-MCNC: 13.3 G/DL (ref 11.5–15.5)
HGB UR QL STRIP.AUTO: NEGATIVE
IMM GRANULOCYTES # BLD AUTO: <0.03 K/UL (ref 0–0.58)
IMM GRANULOCYTES NFR BLD: 0 % (ref 0–5)
KETONES UR STRIP-MCNC: NEGATIVE MG/DL
LEUKOCYTE ESTERASE UR QL STRIP: ABNORMAL
LYMPHOCYTES NFR BLD: 1.3 K/UL (ref 1.5–4)
LYMPHOCYTES RELATIVE PERCENT: 20 % (ref 20–42)
MCH RBC QN AUTO: 32.8 PG (ref 26–35)
MCHC RBC AUTO-ENTMCNC: 34.2 G/DL (ref 32–34.5)
MCV RBC AUTO: 95.8 FL (ref 80–99.9)
MONOCYTES NFR BLD: 0.64 K/UL (ref 0.1–0.95)
MONOCYTES NFR BLD: 10 % (ref 2–12)
NEUTROPHILS NFR BLD: 68 % (ref 43–80)
NEUTS SEG NFR BLD: 4.45 K/UL (ref 1.8–7.3)
NITRITE UR QL STRIP: NEGATIVE
PH UR STRIP: 6 [PH] (ref 5–9)
PLATELET # BLD AUTO: 225 K/UL (ref 130–450)
PMV BLD AUTO: 10.1 FL (ref 7–12)
POTASSIUM SERPL-SCNC: 4 MMOL/L (ref 3.5–5)
PROT SERPL-MCNC: 7.5 G/DL (ref 6.4–8.3)
PROT UR STRIP-MCNC: NEGATIVE MG/DL
RBC # BLD AUTO: 4.06 M/UL (ref 3.5–5.5)
RBC #/AREA URNS HPF: ABNORMAL /HPF
SARS-COV-2 RNA RESP QL NAA+PROBE: NOT DETECTED
SODIUM SERPL-SCNC: 142 MMOL/L (ref 132–146)
SOURCE: NORMAL
SP GR UR STRIP: 1.02 (ref 1–1.03)
SPECIMEN DESCRIPTION: NORMAL
TROPONIN I SERPL HS-MCNC: 10 NG/L (ref 0–9)
TROPONIN I SERPL HS-MCNC: 11 NG/L (ref 0–9)
UROBILINOGEN UR STRIP-ACNC: 0.2 EU/DL (ref 0–1)
WBC #/AREA URNS HPF: ABNORMAL /HPF
WBC OTHER # BLD: 6.6 K/UL (ref 4.5–11.5)

## 2025-01-28 PROCEDURE — 36415 COLL VENOUS BLD VENIPUNCTURE: CPT

## 2025-01-28 PROCEDURE — 87636 SARSCOV2 & INF A&B AMP PRB: CPT

## 2025-01-28 PROCEDURE — 80053 COMPREHEN METABOLIC PANEL: CPT

## 2025-01-28 PROCEDURE — 93010 ELECTROCARDIOGRAM REPORT: CPT | Performed by: INTERNAL MEDICINE

## 2025-01-28 PROCEDURE — 76937 US GUIDE VASCULAR ACCESS: CPT

## 2025-01-28 PROCEDURE — 81001 URINALYSIS AUTO W/SCOPE: CPT

## 2025-01-28 PROCEDURE — 84484 ASSAY OF TROPONIN QUANT: CPT

## 2025-01-28 PROCEDURE — 6370000000 HC RX 637 (ALT 250 FOR IP)

## 2025-01-28 PROCEDURE — 99285 EMERGENCY DEPT VISIT HI MDM: CPT

## 2025-01-28 PROCEDURE — 85025 COMPLETE CBC W/AUTO DIFF WBC: CPT

## 2025-01-28 PROCEDURE — 93005 ELECTROCARDIOGRAM TRACING: CPT

## 2025-01-28 PROCEDURE — 71045 X-RAY EXAM CHEST 1 VIEW: CPT

## 2025-01-28 RX ORDER — CEFDINIR 300 MG/1
300 CAPSULE ORAL 2 TIMES DAILY
Qty: 14 CAPSULE | Refills: 0 | Status: SHIPPED | OUTPATIENT
Start: 2025-01-28 | End: 2025-02-04

## 2025-01-28 RX ORDER — CEFDINIR 300 MG/1
300 CAPSULE ORAL ONCE
Status: COMPLETED | OUTPATIENT
Start: 2025-01-28 | End: 2025-01-28

## 2025-01-28 RX ADMIN — CEFDINIR 300 MG: 300 CAPSULE ORAL at 14:47

## 2025-01-28 NOTE — ED PROVIDER NOTES
[facility] EMERGENCY DEPARTMENT  EMERGENCY DEPARTMENT ENCOUNTER        Pt Name: Carla Felix  MRN: [id number]  Birthdate 1945  Date of evaluation: 1/28/2025  Provider: Geoffrey King MD  PCP: Ty Panda MD  Note Started: 10:01 AM EST 1/28/25    CHIEF COMPLAINT       Chief Complaint   Patient presents with    Fatigue     Couple of weeks, denies fever or chills, denies urinary issues       HISTORY OF PRESENT ILLNESS: 1 or more Elements   History From: Patient    Limitations to history : None  Social Determinants : None    Carla Felix is a 79 y.o. female with a history of hypertension, asthma, GERD who presents with complaints of fatigue over the past couple of weeks.  Patient mentions that she lives in the apartment building by herself and since the past couple of weeks and more so the past week she is feeling generalized bodyaches and weakness.    Denies any fever, chills, nausea, vomiting, headache, dizziness, vision changes, neck tenderness or stiffness, weakness, chest pain, palpitations, leg swelling/tenderness, sob, cough, abdominal pain, dysuria, hematuria, diarrhea, constipation, bloody stools.    Nursing Notes were all reviewed and agreed with or any disagreements were addressed in the HPI.    ROS:   Pertinent positives and negatives are stated within HPI, all other systems reviewed and are negative.      --------------------------------------------- PAST HISTORY ---------------------------------------------  Past Medical History:       Diagnosis Date    Asthma     Back pain     Cancer (HCC)     breast in remission    Cigarette nicotine dependence     GERD (gastroesophageal reflux disease)     Headache(784.0)     Headache(784.0)     Hypertension     Liver disease     hepatitis c     Lumbar radiculopathy     Neck pain     Neuropathy, peripheral, idiopathic     Nonrheumatic mitral (valve) insufficiency        Past Surgical History:       Procedure Laterality Date

## 2025-04-15 ENCOUNTER — TELEPHONE (OUTPATIENT)
Dept: CARDIOLOGY CLINIC | Age: 80
End: 2025-04-15

## 2025-04-16 ENCOUNTER — TELEPHONE (OUTPATIENT)
Dept: CARDIOLOGY CLINIC | Age: 80
End: 2025-04-16

## 2025-04-17 ENCOUNTER — OFFICE VISIT (OUTPATIENT)
Dept: CARDIOLOGY CLINIC | Age: 80
End: 2025-04-17
Payer: MEDICAID

## 2025-04-17 VITALS
BODY MASS INDEX: 28.13 KG/M2 | WEIGHT: 149 LBS | HEART RATE: 87 BPM | HEIGHT: 61 IN | SYSTOLIC BLOOD PRESSURE: 104 MMHG | DIASTOLIC BLOOD PRESSURE: 62 MMHG | RESPIRATION RATE: 16 BRPM

## 2025-04-17 DIAGNOSIS — I34.0 NONRHEUMATIC MITRAL VALVE REGURGITATION: Primary | ICD-10-CM

## 2025-04-17 PROCEDURE — 93000 ELECTROCARDIOGRAM COMPLETE: CPT | Performed by: INTERNAL MEDICINE

## 2025-04-17 PROCEDURE — 99213 OFFICE O/P EST LOW 20 MIN: CPT | Performed by: INTERNAL MEDICINE

## 2025-04-17 PROCEDURE — 1123F ACP DISCUSS/DSCN MKR DOCD: CPT | Performed by: INTERNAL MEDICINE

## 2025-04-17 NOTE — PROGRESS NOTES
[facility] Progress Note  Dr. Salty Mariee      Referring Physician: Ty Panda MD  CHIEF COMPLAINT:   Chief Complaint   Patient presents with    Heart Problem     6 mo       HISTORY OF PRESENT ILLNESS:   Patient is a 79 year old female with history of SOB and palpitations who is here for follow-up appointment  occasional pedal edema, orthostatic dizziness, shortness of breath is at baseline,  patient denies any chest pain, no PND, no orthopnea, no syncope, no presyncopal episodes.  Functional capacity is limited due to back pain and left-sided weakness, ambulate using a cane  + Fatigue    Past Medical History:   Diagnosis Date    Asthma     Back pain     Cancer (HCC)     breast in remission    Cigarette nicotine dependence     GERD (gastroesophageal reflux disease)     Headache(784.0)     Headache(784.0)     Hypertension     Liver disease     hepatitis c     Lumbar radiculopathy     Neck pain     Neuropathy, peripheral, idiopathic     Nonrheumatic mitral (valve) insufficiency          Past Surgical History:   Procedure Laterality Date    BREAST LUMPECTOMY      ERCP  8/6/2014    unsuccessful    HYSTERECTOMY (CERVIX STATUS UNKNOWN)      INNER EAR SURGERY      MASTECTOMY Left 2000    SHOULDER SURGERY           Current Outpatient Medications   Medication Sig Dispense Refill    aspirin (HM ASPIRIN EC LOW DOSE) 81 MG EC tablet TAKE 1 TABLET BY MOUTH 2 TIMES A DAY WITH FOOD LF 9/10/21 180 tablet 3    amLODIPine (NORVASC) 5 MG tablet TK 1 T PO  tablet 3    atorvastatin (LIPITOR) 10 MG tablet TK 1 T PO QD 90 tablet 3    dicyclomine (BENTYL) 10 MG capsule TAKE 1 CAPSULE BY MOUTH 2 TIMES A DAY AS NEEDED FOR ABDOMINAL PAIN 60 capsule 3    brimonidine (ALPHAGAN) 0.2 % ophthalmic solution Place 1 drop into both eyes 2 times daily      albuterol sulfate  (90 Base) MCG/ACT inhaler Inhale 2 puffs into the lungs every 6 hours as needed for Wheezing      metoprolol succinate (TOPROL XL) 25 MG extended release

## 2025-07-06 ENCOUNTER — APPOINTMENT (OUTPATIENT)
Dept: CT IMAGING | Age: 80
DRG: 134 | End: 2025-07-06
Payer: MEDICAID

## 2025-07-06 ENCOUNTER — HOSPITAL ENCOUNTER (INPATIENT)
Age: 80
LOS: 4 days | Discharge: HOME | DRG: 134 | End: 2025-07-10
Attending: EMERGENCY MEDICINE | Admitting: INTERNAL MEDICINE
Payer: MEDICAID

## 2025-07-06 ENCOUNTER — APPOINTMENT (OUTPATIENT)
Dept: GENERAL RADIOLOGY | Age: 80
DRG: 134 | End: 2025-07-06
Payer: MEDICAID

## 2025-07-06 DIAGNOSIS — I26.99 PE (PULMONARY THROMBOEMBOLISM) (HCC): ICD-10-CM

## 2025-07-06 DIAGNOSIS — R06.02 SHORTNESS OF BREATH: ICD-10-CM

## 2025-07-06 DIAGNOSIS — I26.99 PULMONARY EMBOLISM WITHOUT ACUTE COR PULMONALE, UNSPECIFIED CHRONICITY, UNSPECIFIED PULMONARY EMBOLISM TYPE (HCC): Primary | ICD-10-CM

## 2025-07-06 LAB
ALBUMIN SERPL-MCNC: 3.7 G/DL (ref 3.5–5.2)
ALP SERPL-CCNC: 61 U/L (ref 35–104)
ALT SERPL-CCNC: 34 U/L (ref 0–35)
ANION GAP SERPL CALCULATED.3IONS-SCNC: 12 MMOL/L (ref 7–16)
AST SERPL-CCNC: 56 U/L (ref 0–35)
BASOPHILS # BLD: 0.04 K/UL (ref 0–0.2)
BASOPHILS NFR BLD: 1 % (ref 0–2)
BILIRUB SERPL-MCNC: 0.4 MG/DL (ref 0–1.2)
BILIRUB UR QL STRIP: NEGATIVE
BUN SERPL-MCNC: 12 MG/DL (ref 8–23)
CALCIUM SERPL-MCNC: 9 MG/DL (ref 8.8–10.2)
CHLORIDE SERPL-SCNC: 104 MMOL/L (ref 98–107)
CLARITY UR: CLEAR
CO2 SERPL-SCNC: 23 MMOL/L (ref 22–29)
COLOR UR: YELLOW
CREAT SERPL-MCNC: 0.9 MG/DL (ref 0.5–1)
EOSINOPHIL # BLD: 0.19 K/UL (ref 0.05–0.5)
EOSINOPHILS RELATIVE PERCENT: 3 % (ref 0–6)
EPI CELLS #/AREA URNS HPF: ABNORMAL /HPF
ERYTHROCYTE [DISTWIDTH] IN BLOOD BY AUTOMATED COUNT: 13.3 % (ref 11.5–15)
FLUAV RNA RESP QL NAA+PROBE: NOT DETECTED
FLUBV RNA RESP QL NAA+PROBE: NOT DETECTED
GFR, ESTIMATED: 70 ML/MIN/1.73M2
GLUCOSE SERPL-MCNC: 99 MG/DL (ref 74–99)
GLUCOSE UR STRIP-MCNC: NEGATIVE MG/DL
HCT VFR BLD AUTO: 38.1 % (ref 34–48)
HGB BLD-MCNC: 12.3 G/DL (ref 11.5–15.5)
HGB UR QL STRIP.AUTO: NEGATIVE
IMM GRANULOCYTES # BLD AUTO: <0.03 K/UL (ref 0–0.58)
IMM GRANULOCYTES NFR BLD: 0 % (ref 0–5)
KETONES UR STRIP-MCNC: NEGATIVE MG/DL
LACTATE BLDV-SCNC: 1.1 MMOL/L (ref 0.5–2.2)
LEUKOCYTE ESTERASE UR QL STRIP: NEGATIVE
LIPASE SERPL-CCNC: 24 U/L (ref 13–60)
LYMPHOCYTES NFR BLD: 1.99 K/UL (ref 1.5–4)
LYMPHOCYTES RELATIVE PERCENT: 34 % (ref 20–42)
MCH RBC QN AUTO: 31.7 PG (ref 26–35)
MCHC RBC AUTO-ENTMCNC: 32.3 G/DL (ref 32–34.5)
MCV RBC AUTO: 98.2 FL (ref 80–99.9)
MONOCYTES NFR BLD: 0.56 K/UL (ref 0.1–0.95)
MONOCYTES NFR BLD: 10 % (ref 2–12)
NEUTROPHILS NFR BLD: 52 % (ref 43–80)
NEUTS SEG NFR BLD: 3.02 K/UL (ref 1.8–7.3)
NITRITE UR QL STRIP: NEGATIVE
PH UR STRIP: 6 [PH] (ref 5–8)
PLATELET # BLD AUTO: 228 K/UL (ref 130–450)
PMV BLD AUTO: 10 FL (ref 7–12)
POTASSIUM SERPL-SCNC: 5.1 MMOL/L (ref 3.5–5.1)
PROT SERPL-MCNC: 7.5 G/DL (ref 6.4–8.3)
PROT UR STRIP-MCNC: NEGATIVE MG/DL
RBC # BLD AUTO: 3.88 M/UL (ref 3.5–5.5)
RBC #/AREA URNS HPF: ABNORMAL /HPF
SARS-COV-2 RNA RESP QL NAA+PROBE: NOT DETECTED
SODIUM SERPL-SCNC: 138 MMOL/L (ref 136–145)
SOURCE: NORMAL
SP GR UR STRIP: <1.005 (ref 1–1.03)
SPECIMEN DESCRIPTION: NORMAL
TROPONIN I SERPL HS-MCNC: 7 NG/L (ref 0–14)
TROPONIN I SERPL HS-MCNC: 9 NG/L (ref 0–14)
UROBILINOGEN UR STRIP-ACNC: 0.2 EU/DL (ref 0–1)
WBC #/AREA URNS HPF: ABNORMAL /HPF
WBC OTHER # BLD: 5.8 K/UL (ref 4.5–11.5)

## 2025-07-06 PROCEDURE — 94640 AIRWAY INHALATION TREATMENT: CPT

## 2025-07-06 PROCEDURE — 71045 X-RAY EXAM CHEST 1 VIEW: CPT

## 2025-07-06 PROCEDURE — 71275 CT ANGIOGRAPHY CHEST: CPT

## 2025-07-06 PROCEDURE — 70450 CT HEAD/BRAIN W/O DYE: CPT

## 2025-07-06 PROCEDURE — 6360000004 HC RX CONTRAST MEDICATION: Performed by: RADIOLOGY

## 2025-07-06 PROCEDURE — 83605 ASSAY OF LACTIC ACID: CPT

## 2025-07-06 PROCEDURE — 80053 COMPREHEN METABOLIC PANEL: CPT

## 2025-07-06 PROCEDURE — 6370000000 HC RX 637 (ALT 250 FOR IP): Performed by: EMERGENCY MEDICINE

## 2025-07-06 PROCEDURE — 85025 COMPLETE CBC W/AUTO DIFF WBC: CPT

## 2025-07-06 PROCEDURE — 83690 ASSAY OF LIPASE: CPT

## 2025-07-06 PROCEDURE — 2060000000 HC ICU INTERMEDIATE R&B

## 2025-07-06 PROCEDURE — 81001 URINALYSIS AUTO W/SCOPE: CPT

## 2025-07-06 PROCEDURE — 93005 ELECTROCARDIOGRAM TRACING: CPT | Performed by: EMERGENCY MEDICINE

## 2025-07-06 PROCEDURE — 72125 CT NECK SPINE W/O DYE: CPT

## 2025-07-06 PROCEDURE — 87636 SARSCOV2 & INF A&B AMP PRB: CPT

## 2025-07-06 PROCEDURE — 99285 EMERGENCY DEPT VISIT HI MDM: CPT

## 2025-07-06 PROCEDURE — 87086 URINE CULTURE/COLONY COUNT: CPT

## 2025-07-06 PROCEDURE — 84484 ASSAY OF TROPONIN QUANT: CPT

## 2025-07-06 PROCEDURE — 74177 CT ABD & PELVIS W/CONTRAST: CPT

## 2025-07-06 RX ORDER — 0.9 % SODIUM CHLORIDE 0.9 %
1000 INTRAVENOUS SOLUTION INTRAVENOUS ONCE
Status: COMPLETED | OUTPATIENT
Start: 2025-07-07 | End: 2025-07-07

## 2025-07-06 RX ORDER — ONDANSETRON 2 MG/ML
4 INJECTION INTRAMUSCULAR; INTRAVENOUS EVERY 6 HOURS PRN
Status: CANCELLED | OUTPATIENT
Start: 2025-07-06

## 2025-07-06 RX ORDER — ATORVASTATIN CALCIUM 10 MG/1
10 TABLET, FILM COATED ORAL NIGHTLY
Status: DISCONTINUED | OUTPATIENT
Start: 2025-07-07 | End: 2025-07-10 | Stop reason: HOSPADM

## 2025-07-06 RX ORDER — BRIMONIDINE TARTRATE 2 MG/ML
1 SOLUTION/ DROPS OPHTHALMIC 2 TIMES DAILY
Status: DISCONTINUED | OUTPATIENT
Start: 2025-07-07 | End: 2025-07-10 | Stop reason: HOSPADM

## 2025-07-06 RX ORDER — ENOXAPARIN SODIUM 100 MG/ML
40 INJECTION SUBCUTANEOUS DAILY
Status: CANCELLED | OUTPATIENT
Start: 2025-07-07

## 2025-07-06 RX ORDER — SODIUM CHLORIDE 0.9 % (FLUSH) 0.9 %
5-40 SYRINGE (ML) INJECTION PRN
Status: DISCONTINUED | OUTPATIENT
Start: 2025-07-06 | End: 2025-07-10 | Stop reason: HOSPADM

## 2025-07-06 RX ORDER — DEXTROSE MONOHYDRATE 100 MG/ML
INJECTION, SOLUTION INTRAVENOUS CONTINUOUS PRN
Status: DISCONTINUED | OUTPATIENT
Start: 2025-07-06 | End: 2025-07-10 | Stop reason: HOSPADM

## 2025-07-06 RX ORDER — MAGNESIUM SULFATE IN WATER 40 MG/ML
2000 INJECTION, SOLUTION INTRAVENOUS PRN
Status: DISCONTINUED | OUTPATIENT
Start: 2025-07-06 | End: 2025-07-10 | Stop reason: HOSPADM

## 2025-07-06 RX ORDER — GLUCAGON 1 MG/ML
1 KIT INJECTION PRN
Status: DISCONTINUED | OUTPATIENT
Start: 2025-07-06 | End: 2025-07-10 | Stop reason: HOSPADM

## 2025-07-06 RX ORDER — ACETAMINOPHEN 325 MG/1
650 TABLET ORAL EVERY 6 HOURS PRN
Status: DISCONTINUED | OUTPATIENT
Start: 2025-07-06 | End: 2025-07-10 | Stop reason: HOSPADM

## 2025-07-06 RX ORDER — IOPAMIDOL 755 MG/ML
75 INJECTION, SOLUTION INTRAVASCULAR
Status: COMPLETED | OUTPATIENT
Start: 2025-07-06 | End: 2025-07-06

## 2025-07-06 RX ORDER — POTASSIUM CHLORIDE 7.45 MG/ML
10 INJECTION INTRAVENOUS PRN
Status: CANCELLED | OUTPATIENT
Start: 2025-07-06

## 2025-07-06 RX ORDER — ACETAMINOPHEN 650 MG/1
650 SUPPOSITORY RECTAL EVERY 6 HOURS PRN
Status: DISCONTINUED | OUTPATIENT
Start: 2025-07-06 | End: 2025-07-10 | Stop reason: HOSPADM

## 2025-07-06 RX ORDER — AMLODIPINE BESYLATE 5 MG/1
5 TABLET ORAL DAILY
Status: DISCONTINUED | OUTPATIENT
Start: 2025-07-07 | End: 2025-07-10

## 2025-07-06 RX ORDER — ACETAMINOPHEN 500 MG
1000 TABLET ORAL ONCE
Status: COMPLETED | OUTPATIENT
Start: 2025-07-06 | End: 2025-07-06

## 2025-07-06 RX ORDER — ENOXAPARIN SODIUM 100 MG/ML
1 INJECTION SUBCUTANEOUS 2 TIMES DAILY
Status: DISCONTINUED | OUTPATIENT
Start: 2025-07-07 | End: 2025-07-08

## 2025-07-06 RX ORDER — POTASSIUM CHLORIDE 1500 MG/1
40 TABLET, EXTENDED RELEASE ORAL PRN
Status: CANCELLED | OUTPATIENT
Start: 2025-07-06

## 2025-07-06 RX ORDER — POLYETHYLENE GLYCOL 3350 17 G/17G
17 POWDER, FOR SOLUTION ORAL DAILY PRN
Status: DISCONTINUED | OUTPATIENT
Start: 2025-07-06 | End: 2025-07-10 | Stop reason: HOSPADM

## 2025-07-06 RX ORDER — SODIUM CHLORIDE 0.9 % (FLUSH) 0.9 %
5-40 SYRINGE (ML) INJECTION EVERY 12 HOURS SCHEDULED
Status: DISCONTINUED | OUTPATIENT
Start: 2025-07-06 | End: 2025-07-10 | Stop reason: HOSPADM

## 2025-07-06 RX ORDER — ONDANSETRON 4 MG/1
4 TABLET, ORALLY DISINTEGRATING ORAL EVERY 8 HOURS PRN
Status: CANCELLED | OUTPATIENT
Start: 2025-07-06

## 2025-07-06 RX ORDER — IPRATROPIUM BROMIDE AND ALBUTEROL SULFATE 2.5; .5 MG/3ML; MG/3ML
3 SOLUTION RESPIRATORY (INHALATION) ONCE
Status: COMPLETED | OUTPATIENT
Start: 2025-07-06 | End: 2025-07-06

## 2025-07-06 RX ORDER — SODIUM CHLORIDE 9 MG/ML
INJECTION, SOLUTION INTRAVENOUS PRN
Status: DISCONTINUED | OUTPATIENT
Start: 2025-07-06 | End: 2025-07-10 | Stop reason: HOSPADM

## 2025-07-06 RX ORDER — ASPIRIN 81 MG/1
162 TABLET ORAL DAILY
Status: DISCONTINUED | OUTPATIENT
Start: 2025-07-07 | End: 2025-07-10 | Stop reason: HOSPADM

## 2025-07-06 RX ADMIN — ACETAMINOPHEN 1000 MG: 500 TABLET ORAL at 16:56

## 2025-07-06 RX ADMIN — IOPAMIDOL 75 ML: 755 INJECTION, SOLUTION INTRAVENOUS at 17:12

## 2025-07-06 RX ADMIN — IPRATROPIUM BROMIDE AND ALBUTEROL SULFATE 3 DOSE: .5; 3 SOLUTION RESPIRATORY (INHALATION) at 16:34

## 2025-07-06 NOTE — ED PROVIDER NOTES
aspirin EC tablet 162 mg (162 mg Oral Given 7/7/25 1321)   atorvastatin (LIPITOR) tablet 10 mg (has no administration in time range)   brimonidine (ALPHAGAN) 0.2 % ophthalmic solution 1 drop (1 drop Both Eyes Not Given 7/7/25 1421)   enoxaparin (LOVENOX) injection 70 mg (70 mg SubCUTAneous Given 7/7/25 1323)   acetylcysteine (MUCOMYST) 10 % solution 400 mg (400 mg Inhalation Given 7/7/25 1835)   ipratropium 0.5 mg-albuterol 2.5 mg (DUONEB) nebulizer solution 1 Dose (1 Dose Inhalation Given 7/7/25 1835)   acetaminophen (TYLENOL) tablet 1,000 mg (1,000 mg Oral Given 7/6/25 1656)   ipratropium 0.5 mg-albuterol 2.5 mg (DUONEB) nebulizer solution 3 Dose (3 Doses Inhalation Given 7/6/25 1634)   iopamidol (ISOVUE-370) 76 % injection 75 mL (75 mLs IntraVENous Given 7/6/25 1712)   sodium chloride 0.9 % bolus 1,000 mL (0 mLs IntraVENous Stopped 7/7/25 1226)         EKG:  This EKG is signed and interpreted by me.    Normal sinus rhythm rate of 85, no ST segment elevation pression, MI interval 142 MS, QRS 66 MS, QTc 440,    Discussions with other health professionals:   ED Course as of 07/07/25 1941   Sun Jul 06, 2025 2152 Spoke with Gonsalo he will admit the patient [MT]      ED Course User Index  [MT] Sloane Braun DO     Critical care:  Critical Care: Please note that the withdrawal or failure to initiate urgent interventions for this patient would likely result in a life threatening deterioration or permanent disability.      Accordingly this patient received 32 minutes of critical care time, excluding separately billable procedures.        EMERGENCY DEPARTMENT COURSE    Vitals:    Vitals:    07/07/25 1129 07/07/25 1225 07/07/25 1415 07/07/25 1859   BP:  126/65 (!) 143/73 (!) 118/58   Pulse: 98 88 (!) 102 98   Resp: 16 17 20    Temp:  98.4 °F (36.9 °C) 97.9 °F (36.6 °C) 97.5 °F (36.4 °C)   TempSrc:  Oral Oral    SpO2:  98% 98% 97%   Weight:       Height:           ED Course as of 07/07/25 1941   Sun Jul 06, 2025

## 2025-07-07 ENCOUNTER — APPOINTMENT (OUTPATIENT)
Dept: ULTRASOUND IMAGING | Age: 80
DRG: 134 | End: 2025-07-07
Payer: MEDICAID

## 2025-07-07 ENCOUNTER — APPOINTMENT (OUTPATIENT)
Age: 80
DRG: 134 | End: 2025-07-07
Attending: INTERNAL MEDICINE
Payer: MEDICAID

## 2025-07-07 LAB
ALBUMIN SERPL-MCNC: 3.5 G/DL (ref 3.5–5.2)
ALP SERPL-CCNC: 50 U/L (ref 35–104)
ALT SERPL-CCNC: 25 U/L (ref 0–35)
ANION GAP SERPL CALCULATED.3IONS-SCNC: 5 MMOL/L (ref 7–16)
AST SERPL-CCNC: 36 U/L (ref 0–35)
B PARAP IS1001 DNA NPH QL NAA+NON-PROBE: NOT DETECTED
B PERT DNA SPEC QL NAA+PROBE: NOT DETECTED
BASOPHILS # BLD: 0.04 K/UL (ref 0–0.2)
BASOPHILS NFR BLD: 1 % (ref 0–2)
BILIRUB SERPL-MCNC: 0.5 MG/DL (ref 0–1.2)
BUN SERPL-MCNC: 8 MG/DL (ref 8–23)
C PNEUM DNA NPH QL NAA+NON-PROBE: NOT DETECTED
CALCIUM SERPL-MCNC: 8.3 MG/DL (ref 8.8–10.2)
CHLORIDE SERPL-SCNC: 109 MMOL/L (ref 98–107)
CO2 SERPL-SCNC: 28 MMOL/L (ref 22–29)
CREAT SERPL-MCNC: 0.8 MG/DL (ref 0.5–1)
ECHO AV AREA PEAK VELOCITY: 1.7 CM2
ECHO AV AREA VTI: 2 CM2
ECHO AV AREA/BSA PEAK VELOCITY: 1 CM2/M2
ECHO AV AREA/BSA VTI: 1.2 CM2/M2
ECHO AV MEAN GRADIENT: 1 MMHG
ECHO AV MEAN VELOCITY: 0.5 M/S
ECHO AV PEAK GRADIENT: 3 MMHG
ECHO AV PEAK VELOCITY: 0.8 M/S
ECHO AV VELOCITY RATIO: 0.63
ECHO AV VTI: 12.7 CM
ECHO BSA: 1.68 M2
ECHO EST RA PRESSURE: 3 MMHG
ECHO LA DIAMETER INDEX: 1.7 CM/M2
ECHO LA DIAMETER: 2.8 CM
ECHO LV EF PHYSICIAN: 60 %
ECHO LV FRACTIONAL SHORTENING: 18 % (ref 28–44)
ECHO LV INTERNAL DIMENSION DIASTOLE INDEX: 2.36 CM/M2
ECHO LV INTERNAL DIMENSION DIASTOLIC: 3.9 CM (ref 3.9–5.3)
ECHO LV INTERNAL DIMENSION SYSTOLIC INDEX: 1.94 CM/M2
ECHO LV INTERNAL DIMENSION SYSTOLIC: 3.2 CM
ECHO LV IVSD: 0.8 CM (ref 0.6–0.9)
ECHO LV MASS 2D: 97.4 G (ref 67–162)
ECHO LV MASS INDEX 2D: 59 G/M2 (ref 43–95)
ECHO LV POSTERIOR WALL DIASTOLIC: 0.9 CM (ref 0.6–0.9)
ECHO LV RELATIVE WALL THICKNESS RATIO: 0.46
ECHO LVOT AREA: 2.5 CM2
ECHO LVOT AV VTI INDEX: 0.76
ECHO LVOT DIAM: 1.8 CM
ECHO LVOT MEAN GRADIENT: 1 MMHG
ECHO LVOT PEAK GRADIENT: 1 MMHG
ECHO LVOT PEAK VELOCITY: 0.5 M/S
ECHO LVOT STROKE VOLUME INDEX: 15 ML/M2
ECHO LVOT SV: 24.7 ML
ECHO LVOT VTI: 9.7 CM
ECHO MV "A" WAVE DURATION: 110.4 MSEC
ECHO MV A VELOCITY: 0.55 M/S
ECHO MV AREA PHT: 4.6 CM2
ECHO MV AREA VTI: 1.7 CM2
ECHO MV E DECELERATION TIME (DT): 209.3 MS
ECHO MV E VELOCITY: 0.38 M/S
ECHO MV E/A RATIO: 0.69
ECHO MV LVOT VTI INDEX: 1.49
ECHO MV MAX VELOCITY: 1.3 M/S
ECHO MV MEAN GRADIENT: 2 MMHG
ECHO MV MEAN VELOCITY: 0.6 M/S
ECHO MV PEAK GRADIENT: 6 MMHG
ECHO MV PRESSURE HALF TIME (PHT): 48.2 MS
ECHO MV VTI: 14.5 CM
ECHO PV MAX VELOCITY: 0.8 M/S
ECHO PV MEAN GRADIENT: 1 MMHG
ECHO PV MEAN VELOCITY: 0.6 M/S
ECHO PV PEAK GRADIENT: 2 MMHG
ECHO PV VTI: 12.7 CM
ECHO RIGHT VENTRICULAR SYSTOLIC PRESSURE (RVSP): 20 MMHG
ECHO TV REGURGITANT MAX VELOCITY: 2.04 M/S
ECHO TV REGURGITANT PEAK GRADIENT: 17 MMHG
EOSINOPHIL # BLD: 0.26 K/UL (ref 0.05–0.5)
EOSINOPHILS RELATIVE PERCENT: 4 % (ref 0–6)
ERYTHROCYTE [DISTWIDTH] IN BLOOD BY AUTOMATED COUNT: 13.5 % (ref 11.5–15)
FLUAV RNA NPH QL NAA+NON-PROBE: NOT DETECTED
FLUBV RNA NPH QL NAA+NON-PROBE: NOT DETECTED
GFR, ESTIMATED: 77 ML/MIN/1.73M2
GLUCOSE SERPL-MCNC: 88 MG/DL (ref 74–99)
HADV DNA NPH QL NAA+NON-PROBE: NOT DETECTED
HCOV 229E RNA NPH QL NAA+NON-PROBE: NOT DETECTED
HCOV HKU1 RNA NPH QL NAA+NON-PROBE: NOT DETECTED
HCOV NL63 RNA NPH QL NAA+NON-PROBE: NOT DETECTED
HCOV OC43 RNA NPH QL NAA+NON-PROBE: NOT DETECTED
HCT VFR BLD AUTO: 34.7 % (ref 34–48)
HGB BLD-MCNC: 11.3 G/DL (ref 11.5–15.5)
HMPV RNA NPH QL NAA+NON-PROBE: NOT DETECTED
HPIV1 RNA NPH QL NAA+NON-PROBE: NOT DETECTED
HPIV2 RNA NPH QL NAA+NON-PROBE: NOT DETECTED
HPIV3 RNA NPH QL NAA+NON-PROBE: NOT DETECTED
HPIV4 RNA NPH QL NAA+NON-PROBE: NOT DETECTED
IMM GRANULOCYTES # BLD AUTO: <0.03 K/UL (ref 0–0.58)
IMM GRANULOCYTES NFR BLD: 0 % (ref 0–5)
LYMPHOCYTES NFR BLD: 2.13 K/UL (ref 1.5–4)
LYMPHOCYTES RELATIVE PERCENT: 36 % (ref 20–42)
M PNEUMO DNA NPH QL NAA+NON-PROBE: NOT DETECTED
MAGNESIUM SERPL-MCNC: 1.9 MG/DL (ref 1.6–2.4)
MCH RBC QN AUTO: 32.1 PG (ref 26–35)
MCHC RBC AUTO-ENTMCNC: 32.6 G/DL (ref 32–34.5)
MCV RBC AUTO: 98.6 FL (ref 80–99.9)
MONOCYTES NFR BLD: 0.52 K/UL (ref 0.1–0.95)
MONOCYTES NFR BLD: 9 % (ref 2–12)
NEUTROPHILS NFR BLD: 50 % (ref 43–80)
NEUTS SEG NFR BLD: 3.02 K/UL (ref 1.8–7.3)
PHOSPHATE SERPL-MCNC: 3.3 MG/DL (ref 2.5–4.5)
PLATELET # BLD AUTO: 201 K/UL (ref 130–450)
PMV BLD AUTO: 10 FL (ref 7–12)
POTASSIUM SERPL-SCNC: 4.7 MMOL/L (ref 3.5–5.1)
PROCALCITONIN SERPL-MCNC: 0.02 NG/ML (ref 0–0.08)
PROT SERPL-MCNC: 6.6 G/DL (ref 6.4–8.3)
RBC # BLD AUTO: 3.52 M/UL (ref 3.5–5.5)
RSV RNA NPH QL NAA+NON-PROBE: NOT DETECTED
RV+EV RNA NPH QL NAA+NON-PROBE: NOT DETECTED
SARS-COV-2 RNA NPH QL NAA+NON-PROBE: NOT DETECTED
SODIUM SERPL-SCNC: 141 MMOL/L (ref 136–145)
SPECIMEN DESCRIPTION: NORMAL
T4 FREE SERPL-MCNC: 1.6 NG/DL (ref 0.9–1.7)
TSH SERPL DL<=0.05 MIU/L-ACNC: 1.17 UIU/ML (ref 0.27–4.2)
WBC OTHER # BLD: 6 K/UL (ref 4.5–11.5)

## 2025-07-07 PROCEDURE — 6370000000 HC RX 637 (ALT 250 FOR IP): Performed by: INTERNAL MEDICINE

## 2025-07-07 PROCEDURE — 83735 ASSAY OF MAGNESIUM: CPT

## 2025-07-07 PROCEDURE — 84145 PROCALCITONIN (PCT): CPT

## 2025-07-07 PROCEDURE — 93970 EXTREMITY STUDY: CPT

## 2025-07-07 PROCEDURE — 6360000002 HC RX W HCPCS: Performed by: INTERNAL MEDICINE

## 2025-07-07 PROCEDURE — 94640 AIRWAY INHALATION TREATMENT: CPT

## 2025-07-07 PROCEDURE — 85025 COMPLETE CBC W/AUTO DIFF WBC: CPT

## 2025-07-07 PROCEDURE — 84439 ASSAY OF FREE THYROXINE: CPT

## 2025-07-07 PROCEDURE — 0202U NFCT DS 22 TRGT SARS-COV-2: CPT

## 2025-07-07 PROCEDURE — 2580000003 HC RX 258: Performed by: INTERNAL MEDICINE

## 2025-07-07 PROCEDURE — 2060000000 HC ICU INTERMEDIATE R&B

## 2025-07-07 PROCEDURE — 84100 ASSAY OF PHOSPHORUS: CPT

## 2025-07-07 PROCEDURE — 93306 TTE W/DOPPLER COMPLETE: CPT | Performed by: INTERNAL MEDICINE

## 2025-07-07 PROCEDURE — 2500000003 HC RX 250 WO HCPCS: Performed by: INTERNAL MEDICINE

## 2025-07-07 PROCEDURE — 80053 COMPREHEN METABOLIC PANEL: CPT

## 2025-07-07 PROCEDURE — 93306 TTE W/DOPPLER COMPLETE: CPT

## 2025-07-07 PROCEDURE — 84443 ASSAY THYROID STIM HORMONE: CPT

## 2025-07-07 RX ORDER — IPRATROPIUM BROMIDE AND ALBUTEROL SULFATE 2.5; .5 MG/3ML; MG/3ML
1 SOLUTION RESPIRATORY (INHALATION) EVERY 6 HOURS PRN
Status: DISCONTINUED | OUTPATIENT
Start: 2025-07-07 | End: 2025-07-10 | Stop reason: HOSPADM

## 2025-07-07 RX ORDER — ACETYLCYSTEINE 100 MG/ML
4 SOLUTION ORAL; RESPIRATORY (INHALATION)
Status: DISCONTINUED | OUTPATIENT
Start: 2025-07-07 | End: 2025-07-10 | Stop reason: HOSPADM

## 2025-07-07 RX ADMIN — ENOXAPARIN SODIUM 70 MG: 100 INJECTION SUBCUTANEOUS at 23:11

## 2025-07-07 RX ADMIN — ASPIRIN 162 MG: 81 TABLET, COATED ORAL at 13:21

## 2025-07-07 RX ADMIN — ENOXAPARIN SODIUM 70 MG: 100 INJECTION SUBCUTANEOUS at 13:23

## 2025-07-07 RX ADMIN — ACETYLCYSTEINE 400 MG: 100 INHALANT RESPIRATORY (INHALATION) at 18:35

## 2025-07-07 RX ADMIN — SODIUM CHLORIDE 1000 ML: 0.9 INJECTION, SOLUTION INTRAVENOUS at 04:00

## 2025-07-07 RX ADMIN — ENOXAPARIN SODIUM 70 MG: 100 INJECTION SUBCUTANEOUS at 00:15

## 2025-07-07 RX ADMIN — IPRATROPIUM BROMIDE AND ALBUTEROL SULFATE 1 DOSE: .5; 3 SOLUTION RESPIRATORY (INHALATION) at 18:35

## 2025-07-07 RX ADMIN — ATORVASTATIN CALCIUM 10 MG: 10 TABLET, FILM COATED ORAL at 23:11

## 2025-07-07 RX ADMIN — Medication 10 ML: at 03:57

## 2025-07-07 RX ADMIN — AMLODIPINE BESYLATE 5 MG: 5 TABLET ORAL at 14:51

## 2025-07-07 RX ADMIN — Medication 10 ML: at 14:51

## 2025-07-07 RX ADMIN — BRIMONIDINE TARTRATE 1 DROP: 2 SOLUTION OPHTHALMIC at 03:57

## 2025-07-07 NOTE — ACP (ADVANCE CARE PLANNING)
Advance Care Planning   Healthcare Decision Maker:    Primary Decision Maker: Nanette Felix - Brother/Sister - [phone]    Click here to complete Healthcare Decision Makers including selection of the Healthcare Decision Maker Relationship (ie \"Primary\").  Today we documented Decision Maker(s) consistent with Legal Next of Kin hierarchy.   Electronically signed by NELIA Duncan on 7/7/2025 at 12:58 PM

## 2025-07-07 NOTE — CARE COORDINATION
25 SS Note: Pt w/PE & Mild mucous plugging in the right lower lobe. Started on Lovenox and Mucomyst. Surgery notes outpatient for mammogram and ultrasound of bilateral breast. Pt on RA 5L and no hx of 02 but has nebulizer @ home- unsure who with & not working last 4-5 months. She has been using her inhaler. Pt has no past hx of HHC. HX of CONCHA, cannot recall name. Pt is single, has no children & her parents are . She lives alone on 4th floor of apartment [facility]. w/elevator access. PCP is Ty Panda MD & Pharmacy is [facility]- they deliver to her. Pt has Passport Waiver (PPW) provided by [facility]. She has declined HHA services & receives ERS & meals delivered. PTA, pt is independent in IADL's/ADL's, & has insurance. Pt has following DME: Rollator, WW, cane, Shower chair. Pt plans on returning home & her sister to transport home.  Electronically signed by NELIA Duncan on 2025 at 12:57 PM

## 2025-07-07 NOTE — H&P
07/06/2025 03:52 PM    HCT 38.1 07/06/2025 03:52 PM     07/06/2025 03:52 PM    MCV 98.2 07/06/2025 03:52 PM    MCH 31.7 07/06/2025 03:52 PM    MCHC 32.3 07/06/2025 03:52 PM    RDW 13.3 07/06/2025 03:52 PM    LYMPHOPCT 34 07/06/2025 03:52 PM    MONOPCT 10 07/06/2025 03:52 PM    EOSPCT 3 07/06/2025 03:52 PM    BASOPCT 1 07/06/2025 03:52 PM    MONOSABS 0.56 07/06/2025 03:52 PM    LYMPHSABS 1.99 07/06/2025 03:52 PM    EOSABS 0.19 07/06/2025 03:52 PM    BASOSABS 0.04 07/06/2025 03:52 PM     CMP:    Lab Results   Component Value Date/Time     07/06/2025 03:52 PM    K 5.1 07/06/2025 03:52 PM     07/06/2025 03:52 PM    CO2 23 07/06/2025 03:52 PM    BUN 12 07/06/2025 03:52 PM    CREATININE 0.9 07/06/2025 03:52 PM    GFRAA >60 05/28/2014 11:50 AM    LABGLOM 70 07/06/2025 03:52 PM    GLUCOSE 99 07/06/2025 03:52 PM    CALCIUM 9.0 07/06/2025 03:52 PM    BILITOT 0.4 07/06/2025 03:52 PM    ALKPHOS 61 07/06/2025 03:52 PM    AST 56 07/06/2025 03:52 PM    ALT 34 07/06/2025 03:52 PM       ASSESSMENT/PLAN:  Pulmonary embolism  Mild mucous plugging in the right lower lobe bronchioles may relate to   secretions, trace aspirate, or small airways inflammation.   Mild dilation of the main pulmonary trunk to 3.2 cm can be seen in the   setting of chronic pulmonary arterial hypertension.   Soft tissue in the surgical bed is nonspecific.  Mammography is the preferred modality for evaluation of disease recurrence.   ?Cancer  Asthma  History of hepatitis C  Peripheral neuropathy  GERD  Hypertension  Current tobacco use    Patient presented to the ED due to shortness of breath and lightheadedness and dizziness.  Patient had CTA showed chronic pulmonary embolism as well as mucous plugging..  Patient started on Lovenox and Mucomyst.  CT scan also showed findings suggestive of pulmonary hypertension.  However extremity ultrasound and echocardiogram will be ordered.  Viral respiratory panel ordered.  Will continue to monitor pulse

## 2025-07-07 NOTE — CONSULTS
General Surgery Consult    Patient's Name/Date of Birth: Carla Felix / 1945    Date: July 7, 2025     Consulting Surgeon: Thomas Swan M.D.    PCP: Ty Panda MD     Chief Complaint: chest pain    HPI:   Carla Felix is a 79 y.o. female who presents for  evaluation of sob and lightheadedness and came to ED and workup with CTs showed PE but also some abnormalities in left breast area on CT that showed a soft tissue density.       Past Medical History:   Diagnosis Date    Asthma     Back pain     Cancer (HCC)     breast in remission    Cigarette nicotine dependence     GERD (gastroesophageal reflux disease)     Headache(784.0)     Headache(784.0)     Hypertension     Liver disease     hepatitis c     Lumbar radiculopathy     Neck pain     Neuropathy, peripheral, idiopathic     Nonrheumatic mitral (valve) insufficiency        Past Surgical History:   Procedure Laterality Date    BREAST LUMPECTOMY      ERCP  8/6/2014    unsuccessful    HYSTERECTOMY (CERVIX STATUS UNKNOWN)      INNER EAR SURGERY      MASTECTOMY Left 2000    SHOULDER SURGERY         Current Facility-Administered Medications   Medication Dose Route Frequency Provider Last Rate Last Admin    acetylcysteine (MUCOMYST) 10 % solution 400 mg  4 mL Inhalation BID RT Yaneth, Ismail U, DO        glucose chewable tablet 16 g  4 tablet Oral PRN Yaneth, Ismail U, DO        dextrose bolus 10% 125 mL  125 mL IntraVENous PRN Yaneth, Ismail U, DO        Or    dextrose bolus 10% 250 mL  250 mL IntraVENous PRN Yaneth, Ismail U, DO        glucagon injection 1 mg  1 mg SubCUTAneous PRN Yaneth, Ismail U, DO        dextrose 10 % infusion   IntraVENous Continuous PRN Yaneth, Ismail U, DO        sodium chloride flush 0.9 % injection 5-40 mL  5-40 mL IntraVENous 2 times per day Yaneth, Ismail U, DO   10 mL at 07/07/25 0357    sodium chloride flush 0.9 % injection 5-40 mL  5-40 mL IntraVENous PRN Yaneth, Ismail U, DO        0.9 % sodium chloride infusion

## 2025-07-08 LAB
ALBUMIN SERPL-MCNC: 3.5 G/DL (ref 3.5–5.2)
ALP SERPL-CCNC: 51 U/L (ref 35–104)
ALT SERPL-CCNC: 23 U/L (ref 0–35)
ANION GAP SERPL CALCULATED.3IONS-SCNC: 10 MMOL/L (ref 7–16)
AST SERPL-CCNC: 46 U/L (ref 0–35)
BASOPHILS # BLD: 0.04 K/UL (ref 0–0.2)
BASOPHILS NFR BLD: 1 % (ref 0–2)
BILIRUB SERPL-MCNC: 0.4 MG/DL (ref 0–1.2)
BUN SERPL-MCNC: 8 MG/DL (ref 8–23)
CALCIUM SERPL-MCNC: 9.1 MG/DL (ref 8.8–10.2)
CHLORIDE SERPL-SCNC: 106 MMOL/L (ref 98–107)
CO2 SERPL-SCNC: 24 MMOL/L (ref 22–29)
CREAT SERPL-MCNC: 0.9 MG/DL (ref 0.5–1)
EKG ATRIAL RATE: 85 BPM
EKG P AXIS: 60 DEGREES
EKG P-R INTERVAL: 142 MS
EKG Q-T INTERVAL: 370 MS
EKG QRS DURATION: 66 MS
EKG QTC CALCULATION (BAZETT): 440 MS
EKG R AXIS: 27 DEGREES
EKG T AXIS: 61 DEGREES
EKG VENTRICULAR RATE: 85 BPM
EOSINOPHIL # BLD: 0.36 K/UL (ref 0.05–0.5)
EOSINOPHILS RELATIVE PERCENT: 7 % (ref 0–6)
ERYTHROCYTE [DISTWIDTH] IN BLOOD BY AUTOMATED COUNT: 13.8 % (ref 11.5–15)
GFR, ESTIMATED: 65 ML/MIN/1.73M2
GLUCOSE SERPL-MCNC: 85 MG/DL (ref 74–99)
HCT VFR BLD AUTO: 40.5 % (ref 34–48)
HGB BLD-MCNC: 12.8 G/DL (ref 11.5–15.5)
IMM GRANULOCYTES # BLD AUTO: <0.03 K/UL (ref 0–0.58)
IMM GRANULOCYTES NFR BLD: 0 % (ref 0–5)
LYMPHOCYTES NFR BLD: 2.03 K/UL (ref 1.5–4)
LYMPHOCYTES RELATIVE PERCENT: 37 % (ref 20–42)
MCH RBC QN AUTO: 33.1 PG (ref 26–35)
MCHC RBC AUTO-ENTMCNC: 31.6 G/DL (ref 32–34.5)
MCV RBC AUTO: 104.7 FL (ref 80–99.9)
MICROORGANISM SPEC CULT: ABNORMAL
MONOCYTES NFR BLD: 0.5 K/UL (ref 0.1–0.95)
MONOCYTES NFR BLD: 9 % (ref 2–12)
NEUTROPHILS NFR BLD: 47 % (ref 43–80)
NEUTS SEG NFR BLD: 2.58 K/UL (ref 1.8–7.3)
PLATELET # BLD AUTO: 203 K/UL (ref 130–450)
PMV BLD AUTO: 10.8 FL (ref 7–12)
POTASSIUM SERPL-SCNC: 5.7 MMOL/L (ref 3.5–5.1)
PROT SERPL-MCNC: 7.2 G/DL (ref 6.4–8.3)
RBC # BLD AUTO: 3.87 M/UL (ref 3.5–5.5)
SERVICE CMNT-IMP: ABNORMAL
SODIUM SERPL-SCNC: 140 MMOL/L (ref 136–145)
SPECIMEN DESCRIPTION: ABNORMAL
WBC OTHER # BLD: 5.5 K/UL (ref 4.5–11.5)

## 2025-07-08 PROCEDURE — 6370000000 HC RX 637 (ALT 250 FOR IP)

## 2025-07-08 PROCEDURE — 2060000000 HC ICU INTERMEDIATE R&B

## 2025-07-08 PROCEDURE — 6370000000 HC RX 637 (ALT 250 FOR IP): Performed by: INTERNAL MEDICINE

## 2025-07-08 PROCEDURE — 2500000003 HC RX 250 WO HCPCS: Performed by: INTERNAL MEDICINE

## 2025-07-08 PROCEDURE — 36415 COLL VENOUS BLD VENIPUNCTURE: CPT

## 2025-07-08 PROCEDURE — 94640 AIRWAY INHALATION TREATMENT: CPT

## 2025-07-08 PROCEDURE — 93010 ELECTROCARDIOGRAM REPORT: CPT | Performed by: INTERNAL MEDICINE

## 2025-07-08 PROCEDURE — 80053 COMPREHEN METABOLIC PANEL: CPT

## 2025-07-08 PROCEDURE — 6360000002 HC RX W HCPCS

## 2025-07-08 PROCEDURE — 85025 COMPLETE CBC W/AUTO DIFF WBC: CPT

## 2025-07-08 RX ORDER — SORBITOL SOLUTION 70 %
60 SOLUTION, ORAL MISCELLANEOUS ONCE
Status: COMPLETED | OUTPATIENT
Start: 2025-07-08 | End: 2025-07-08

## 2025-07-08 RX ORDER — ONDANSETRON 2 MG/ML
4 INJECTION INTRAMUSCULAR; INTRAVENOUS EVERY 6 HOURS PRN
Status: DISCONTINUED | OUTPATIENT
Start: 2025-07-08 | End: 2025-07-10 | Stop reason: HOSPADM

## 2025-07-08 RX ORDER — BISACODYL 5 MG/1
10 TABLET, DELAYED RELEASE ORAL ONCE
Status: COMPLETED | OUTPATIENT
Start: 2025-07-08 | End: 2025-07-08

## 2025-07-08 RX ADMIN — IPRATROPIUM BROMIDE AND ALBUTEROL SULFATE 1 DOSE: .5; 3 SOLUTION RESPIRATORY (INHALATION) at 17:34

## 2025-07-08 RX ADMIN — SODIUM CHLORIDE, PRESERVATIVE FREE 10 ML: 5 INJECTION INTRAVENOUS at 12:16

## 2025-07-08 RX ADMIN — IPRATROPIUM BROMIDE AND ALBUTEROL SULFATE 1 DOSE: .5; 3 SOLUTION RESPIRATORY (INHALATION) at 04:51

## 2025-07-08 RX ADMIN — APIXABAN 10 MG: 5 TABLET, FILM COATED ORAL at 13:18

## 2025-07-08 RX ADMIN — SORBITOL SOLUTION (BULK) 60 ML: 70 SOLUTION at 12:17

## 2025-07-08 RX ADMIN — ATORVASTATIN CALCIUM 10 MG: 10 TABLET, FILM COATED ORAL at 20:38

## 2025-07-08 RX ADMIN — ACETYLCYSTEINE 400 MG: 100 INHALANT RESPIRATORY (INHALATION) at 04:51

## 2025-07-08 RX ADMIN — Medication 10 ML: at 06:27

## 2025-07-08 RX ADMIN — APIXABAN 10 MG: 5 TABLET, FILM COATED ORAL at 20:38

## 2025-07-08 RX ADMIN — AMLODIPINE BESYLATE 5 MG: 5 TABLET ORAL at 08:36

## 2025-07-08 RX ADMIN — ASPIRIN 162 MG: 81 TABLET, COATED ORAL at 08:36

## 2025-07-08 RX ADMIN — ONDANSETRON 4 MG: 2 INJECTION, SOLUTION INTRAMUSCULAR; INTRAVENOUS at 12:16

## 2025-07-08 RX ADMIN — Medication 10 ML: at 20:39

## 2025-07-08 RX ADMIN — BRIMONIDINE TARTRATE 1 DROP: 2 SOLUTION OPHTHALMIC at 20:39

## 2025-07-08 RX ADMIN — BISACODYL 10 MG: 5 TABLET, COATED ORAL at 12:16

## 2025-07-08 RX ADMIN — BRIMONIDINE TARTRATE 1 DROP: 2 SOLUTION OPHTHALMIC at 13:20

## 2025-07-08 RX ADMIN — Medication 10 ML: at 08:37

## 2025-07-08 RX ADMIN — ACETYLCYSTEINE 400 MG: 100 INHALANT RESPIRATORY (INHALATION) at 17:34

## 2025-07-08 NOTE — PLAN OF CARE
Problem: Discharge Planning  Goal: Discharge to home or other facility with appropriate resources  7/8/2025 0454 by Castro Walter, RN  Outcome: Progressing  7/7/2025 1714 by Concha Remy, RN  Outcome: Progressing     Problem: Safety - Adult  Goal: Free from fall injury  7/8/2025 0454 by Castro Walter, RN  Outcome: Progressing  7/7/2025 1714 by Concha Remy, RN  Outcome: Progressing

## 2025-07-09 LAB
ALBUMIN SERPL-MCNC: 3.4 G/DL (ref 3.5–5.2)
ALP SERPL-CCNC: 47 U/L (ref 35–104)
ALT SERPL-CCNC: 18 U/L (ref 0–35)
ANION GAP SERPL CALCULATED.3IONS-SCNC: 9 MMOL/L (ref 7–16)
AST SERPL-CCNC: 24 U/L (ref 0–35)
BASOPHILS # BLD: 0.02 K/UL (ref 0–0.2)
BASOPHILS NFR BLD: 0 % (ref 0–2)
BILIRUB SERPL-MCNC: 0.3 MG/DL (ref 0–1.2)
BUN SERPL-MCNC: 15 MG/DL (ref 8–23)
CALCIUM SERPL-MCNC: 9.3 MG/DL (ref 8.8–10.2)
CHLORIDE SERPL-SCNC: 106 MMOL/L (ref 98–107)
CO2 SERPL-SCNC: 25 MMOL/L (ref 22–29)
CREAT SERPL-MCNC: 1.2 MG/DL (ref 0.5–1)
EOSINOPHIL # BLD: 0.36 K/UL (ref 0.05–0.5)
EOSINOPHILS RELATIVE PERCENT: 6 % (ref 0–6)
ERYTHROCYTE [DISTWIDTH] IN BLOOD BY AUTOMATED COUNT: 13.8 % (ref 11.5–15)
GFR, ESTIMATED: 47 ML/MIN/1.73M2
GLUCOSE SERPL-MCNC: 88 MG/DL (ref 74–99)
HCT VFR BLD AUTO: 33.3 % (ref 34–48)
HGB BLD-MCNC: 10.7 G/DL (ref 11.5–15.5)
IMM GRANULOCYTES # BLD AUTO: <0.03 K/UL (ref 0–0.58)
IMM GRANULOCYTES NFR BLD: 0 % (ref 0–5)
LYMPHOCYTES NFR BLD: 1.76 K/UL (ref 1.5–4)
LYMPHOCYTES RELATIVE PERCENT: 29 % (ref 20–42)
MCH RBC QN AUTO: 32.2 PG (ref 26–35)
MCHC RBC AUTO-ENTMCNC: 32.1 G/DL (ref 32–34.5)
MCV RBC AUTO: 100.3 FL (ref 80–99.9)
MONOCYTES NFR BLD: 0.66 K/UL (ref 0.1–0.95)
MONOCYTES NFR BLD: 11 % (ref 2–12)
NEUTROPHILS NFR BLD: 54 % (ref 43–80)
NEUTS SEG NFR BLD: 3.26 K/UL (ref 1.8–7.3)
PLATELET # BLD AUTO: 208 K/UL (ref 130–450)
PMV BLD AUTO: 10.2 FL (ref 7–12)
POTASSIUM SERPL-SCNC: 4.1 MMOL/L (ref 3.5–5.1)
PROT SERPL-MCNC: 6.5 G/DL (ref 6.4–8.3)
RBC # BLD AUTO: 3.32 M/UL (ref 3.5–5.5)
SODIUM SERPL-SCNC: 139 MMOL/L (ref 136–145)
WBC OTHER # BLD: 6.1 K/UL (ref 4.5–11.5)

## 2025-07-09 PROCEDURE — 6370000000 HC RX 637 (ALT 250 FOR IP)

## 2025-07-09 PROCEDURE — 2060000000 HC ICU INTERMEDIATE R&B

## 2025-07-09 PROCEDURE — 2500000003 HC RX 250 WO HCPCS: Performed by: INTERNAL MEDICINE

## 2025-07-09 PROCEDURE — 97116 GAIT TRAINING THERAPY: CPT | Performed by: PHYSICAL THERAPIST

## 2025-07-09 PROCEDURE — 97161 PT EVAL LOW COMPLEX 20 MIN: CPT | Performed by: PHYSICAL THERAPIST

## 2025-07-09 PROCEDURE — 6370000000 HC RX 637 (ALT 250 FOR IP): Performed by: INTERNAL MEDICINE

## 2025-07-09 PROCEDURE — 94640 AIRWAY INHALATION TREATMENT: CPT

## 2025-07-09 PROCEDURE — 36415 COLL VENOUS BLD VENIPUNCTURE: CPT

## 2025-07-09 PROCEDURE — 80053 COMPREHEN METABOLIC PANEL: CPT

## 2025-07-09 PROCEDURE — 85025 COMPLETE CBC W/AUTO DIFF WBC: CPT

## 2025-07-09 RX ORDER — BROMPHENIRAMINE MALEATE, PSEUDOEPHEDRINE HYDROCHLORIDE, AND DEXTROMETHORPHAN HYDROBROMIDE 2; 30; 10 MG/5ML; MG/5ML; MG/5ML
5 SYRUP ORAL EVERY 6 HOURS PRN
Status: DISCONTINUED | OUTPATIENT
Start: 2025-07-09 | End: 2025-07-10 | Stop reason: HOSPADM

## 2025-07-09 RX ADMIN — AMLODIPINE BESYLATE 5 MG: 5 TABLET ORAL at 08:07

## 2025-07-09 RX ADMIN — BROMPHENIRAMINE MALEATE, PSEUDOEPHEDRINE HYDROCHLORIDE, AND DEXTROMETHORPHAN HYDROBROMIDE 5 ML: 2; 30; 10 SYRUP ORAL at 18:11

## 2025-07-09 RX ADMIN — ACETYLCYSTEINE 400 MG: 100 INHALANT RESPIRATORY (INHALATION) at 06:31

## 2025-07-09 RX ADMIN — Medication 10 ML: at 08:11

## 2025-07-09 RX ADMIN — ACETYLCYSTEINE 400 MG: 100 INHALANT RESPIRATORY (INHALATION) at 18:26

## 2025-07-09 RX ADMIN — APIXABAN 10 MG: 5 TABLET, FILM COATED ORAL at 20:41

## 2025-07-09 RX ADMIN — ASPIRIN 162 MG: 81 TABLET, COATED ORAL at 08:08

## 2025-07-09 RX ADMIN — APIXABAN 10 MG: 5 TABLET, FILM COATED ORAL at 08:07

## 2025-07-09 RX ADMIN — BRIMONIDINE TARTRATE 1 DROP: 2 SOLUTION OPHTHALMIC at 08:08

## 2025-07-09 RX ADMIN — Medication 10 ML: at 20:43

## 2025-07-09 RX ADMIN — ATORVASTATIN CALCIUM 10 MG: 10 TABLET, FILM COATED ORAL at 20:42

## 2025-07-09 RX ADMIN — ACETAMINOPHEN 650 MG: 325 TABLET ORAL at 16:31

## 2025-07-09 RX ADMIN — IPRATROPIUM BROMIDE AND ALBUTEROL SULFATE 1 DOSE: .5; 3 SOLUTION RESPIRATORY (INHALATION) at 14:38

## 2025-07-09 RX ADMIN — IPRATROPIUM BROMIDE AND ALBUTEROL SULFATE 1 DOSE: .5; 3 SOLUTION RESPIRATORY (INHALATION) at 06:31

## 2025-07-09 RX ADMIN — BRIMONIDINE TARTRATE 1 DROP: 2 SOLUTION OPHTHALMIC at 20:43

## 2025-07-09 ASSESSMENT — PAIN DESCRIPTION - LOCATION: LOCATION: ABDOMEN

## 2025-07-09 ASSESSMENT — PAIN SCALES - GENERAL
PAINLEVEL_OUTOF10: 8
PAINLEVEL_OUTOF10: 0

## 2025-07-09 ASSESSMENT — PAIN DESCRIPTION - DESCRIPTORS: DESCRIPTORS: DISCOMFORT

## 2025-07-09 ASSESSMENT — PAIN DESCRIPTION - ORIENTATION: ORIENTATION: LEFT

## 2025-07-09 NOTE — PLAN OF CARE
Problem: Discharge Planning  Goal: Discharge to home or other facility with appropriate resources  7/9/2025 0022 by Linda Alejandra, RN  Outcome: Progressing  7/8/2025 1505 by Deandra Hines, RN  Outcome: Progressing  Flowsheets (Taken 7/8/2025 0885)  Discharge to home or other facility with appropriate resources: Identify barriers to discharge with patient and caregiver

## 2025-07-09 NOTE — CARE COORDINATION
7/9/25 1550 CM note: resp panel (-) 7/7/25, urine cx (+) 7/6/25. Pt admitted w/ SOB. CTA shows chronic PE. NEW eliquis. Hx breast cancer, asthma, Hep C. Gen surgery and PT/OT following. Per surgery notes outpt for mammogram and US bilateral breast. Room air. Discharge plan is home. Awaiting therapy evals. Pt lives alone on 4th floor of apartment [facility] w/elevator access. She has Passport Waiver provided by [facility]. She has declined HHA services & receives ERS & meals delivered. Her DME includes a rollator, WW, cane, shower chair. Pts sister to transport home. CM will follow. Electronically signed by Brianne Mckeon RN on 7/9/2025 at 4:00 PM

## 2025-07-09 NOTE — PLAN OF CARE
Problem: Discharge Planning  Goal: Discharge to home or other facility with appropriate resources  7/9/2025 1243 by Deandra Hines RN  Outcome: Progressing  Flowsheets (Taken 7/9/2025 0818)  Discharge to home or other facility with appropriate resources: Identify barriers to discharge with patient and caregiver  7/9/2025 0022 by Linda Alejandra RN  Outcome: Progressing     Problem: Safety - Adult  Goal: Free from fall injury  7/9/2025 1243 by Deandra Hines RN  Outcome: Progressing  Flowsheets (Taken 7/9/2025 1115)  Free From Fall Injury: Instruct family/caregiver on patient safety  7/9/2025 0022 by Linda Alejandra RN  Outcome: Progressing     Problem: ABCDS Injury Assessment  Goal: Absence of physical injury  7/9/2025 1243 by Deandra Hines RN  Outcome: Progressing  Flowsheets (Taken 7/9/2025 1115)  Absence of Physical Injury: Implement safety measures based on patient assessment  7/9/2025 0022 by Linda Alejandra RN  Outcome: Progressing     Problem: Skin/Tissue Integrity  Goal: Skin integrity remains intact  Description: 1.  Monitor for areas of redness and/or skin breakdown  2.  Assess vascular access sites hourly  3.  Every 4-6 hours minimum:  Change oxygen saturation probe site  4.  Every 4-6 hours:  If on nasal continuous positive airway pressure, respiratory therapy assess nares and determine need for appliance change or resting period  Outcome: Progressing

## 2025-07-10 VITALS
RESPIRATION RATE: 16 BRPM | WEIGHT: 145 LBS | BODY MASS INDEX: 27.38 KG/M2 | HEIGHT: 61 IN | HEART RATE: 95 BPM | OXYGEN SATURATION: 97 % | DIASTOLIC BLOOD PRESSURE: 70 MMHG | SYSTOLIC BLOOD PRESSURE: 122 MMHG | TEMPERATURE: 98.1 F

## 2025-07-10 LAB
ALBUMIN SERPL-MCNC: 3.5 G/DL (ref 3.5–5.2)
ALP SERPL-CCNC: 50 U/L (ref 35–104)
ALT SERPL-CCNC: 39 U/L (ref 0–35)
ANION GAP SERPL CALCULATED.3IONS-SCNC: 10 MMOL/L (ref 7–16)
AST SERPL-CCNC: 64 U/L (ref 0–35)
BASOPHILS # BLD: 0.04 K/UL (ref 0–0.2)
BASOPHILS NFR BLD: 1 % (ref 0–2)
BILIRUB SERPL-MCNC: 0.2 MG/DL (ref 0–1.2)
BUN SERPL-MCNC: 16 MG/DL (ref 8–23)
CALCIUM SERPL-MCNC: 9 MG/DL (ref 8.8–10.2)
CHLORIDE SERPL-SCNC: 105 MMOL/L (ref 98–107)
CO2 SERPL-SCNC: 25 MMOL/L (ref 22–29)
CREAT SERPL-MCNC: 1.1 MG/DL (ref 0.5–1)
EOSINOPHIL # BLD: 0.38 K/UL (ref 0.05–0.5)
EOSINOPHILS RELATIVE PERCENT: 7 % (ref 0–6)
ERYTHROCYTE [DISTWIDTH] IN BLOOD BY AUTOMATED COUNT: 13.9 % (ref 11.5–15)
GFR, ESTIMATED: 54 ML/MIN/1.73M2
GLUCOSE SERPL-MCNC: 81 MG/DL (ref 74–99)
HCT VFR BLD AUTO: 34.7 % (ref 34–48)
HGB BLD-MCNC: 11.2 G/DL (ref 11.5–15.5)
IMM GRANULOCYTES # BLD AUTO: <0.03 K/UL (ref 0–0.58)
IMM GRANULOCYTES NFR BLD: 0 % (ref 0–5)
LYMPHOCYTES NFR BLD: 2.02 K/UL (ref 1.5–4)
LYMPHOCYTES RELATIVE PERCENT: 37 % (ref 20–42)
MCH RBC QN AUTO: 32.7 PG (ref 26–35)
MCHC RBC AUTO-ENTMCNC: 32.3 G/DL (ref 32–34.5)
MCV RBC AUTO: 101.2 FL (ref 80–99.9)
MONOCYTES NFR BLD: 0.65 K/UL (ref 0.1–0.95)
MONOCYTES NFR BLD: 12 % (ref 2–12)
NEUTROPHILS NFR BLD: 43 % (ref 43–80)
NEUTS SEG NFR BLD: 2.36 K/UL (ref 1.8–7.3)
PLATELET # BLD AUTO: 202 K/UL (ref 130–450)
PMV BLD AUTO: 11.1 FL (ref 7–12)
POTASSIUM SERPL-SCNC: 4.9 MMOL/L (ref 3.5–5.1)
PROT SERPL-MCNC: 6.7 G/DL (ref 6.4–8.3)
RBC # BLD AUTO: 3.43 M/UL (ref 3.5–5.5)
SODIUM SERPL-SCNC: 140 MMOL/L (ref 136–145)
WBC OTHER # BLD: 5.5 K/UL (ref 4.5–11.5)

## 2025-07-10 PROCEDURE — 6370000000 HC RX 637 (ALT 250 FOR IP)

## 2025-07-10 PROCEDURE — 85025 COMPLETE CBC W/AUTO DIFF WBC: CPT

## 2025-07-10 PROCEDURE — 36415 COLL VENOUS BLD VENIPUNCTURE: CPT

## 2025-07-10 PROCEDURE — 6370000000 HC RX 637 (ALT 250 FOR IP): Performed by: INTERNAL MEDICINE

## 2025-07-10 PROCEDURE — 2500000003 HC RX 250 WO HCPCS: Performed by: INTERNAL MEDICINE

## 2025-07-10 PROCEDURE — 94640 AIRWAY INHALATION TREATMENT: CPT

## 2025-07-10 PROCEDURE — 80053 COMPREHEN METABOLIC PANEL: CPT

## 2025-07-10 PROCEDURE — 97530 THERAPEUTIC ACTIVITIES: CPT

## 2025-07-10 PROCEDURE — 97165 OT EVAL LOW COMPLEX 30 MIN: CPT

## 2025-07-10 RX ORDER — TIOTROPIUM BROMIDE 18 UG/1
18 CAPSULE ORAL; RESPIRATORY (INHALATION) EVERY MORNING
COMMUNITY
Start: 2025-07-10

## 2025-07-10 RX ORDER — ATORVASTATIN CALCIUM 10 MG/1
TABLET, FILM COATED ORAL
Qty: 90 TABLET | Refills: 0 | Status: SHIPPED | OUTPATIENT
Start: 2025-07-10

## 2025-07-10 RX ADMIN — IPRATROPIUM BROMIDE AND ALBUTEROL SULFATE 1 DOSE: .5; 3 SOLUTION RESPIRATORY (INHALATION) at 05:57

## 2025-07-10 RX ADMIN — ASPIRIN 162 MG: 81 TABLET, COATED ORAL at 08:13

## 2025-07-10 RX ADMIN — Medication 10 ML: at 08:12

## 2025-07-10 RX ADMIN — ACETYLCYSTEINE 400 MG: 100 INHALANT RESPIRATORY (INHALATION) at 05:58

## 2025-07-10 RX ADMIN — BROMPHENIRAMINE MALEATE, PSEUDOEPHEDRINE HYDROCHLORIDE, AND DEXTROMETHORPHAN HYDROBROMIDE 5 ML: 2; 30; 10 SYRUP ORAL at 05:07

## 2025-07-10 RX ADMIN — BRIMONIDINE TARTRATE 1 DROP: 2 SOLUTION OPHTHALMIC at 08:13

## 2025-07-10 RX ADMIN — APIXABAN 10 MG: 5 TABLET, FILM COATED ORAL at 08:12

## 2025-07-10 ASSESSMENT — PAIN DESCRIPTION - FREQUENCY: FREQUENCY: INTERMITTENT

## 2025-07-10 ASSESSMENT — PAIN SCALES - GENERAL
PAINLEVEL_OUTOF10: 2
PAINLEVEL_OUTOF10: 0
PAINLEVEL_OUTOF10: 0

## 2025-07-10 ASSESSMENT — PAIN DESCRIPTION - ONSET: ONSET: PROGRESSIVE

## 2025-07-10 ASSESSMENT — PAIN - FUNCTIONAL ASSESSMENT: PAIN_FUNCTIONAL_ASSESSMENT: ACTIVITIES ARE NOT PREVENTED

## 2025-07-10 ASSESSMENT — PAIN DESCRIPTION - ORIENTATION: ORIENTATION: LEFT;UPPER

## 2025-07-10 ASSESSMENT — PAIN DESCRIPTION - PAIN TYPE: TYPE: ACUTE PAIN

## 2025-07-10 ASSESSMENT — PAIN DESCRIPTION - LOCATION: LOCATION: RIB CAGE

## 2025-07-10 ASSESSMENT — PAIN DESCRIPTION - DESCRIPTORS: DESCRIPTORS: ACHING;OTHER (COMMENT)

## 2025-07-10 NOTE — CARE COORDINATION
7/110/25 1135 CM note: resp panel (-) 7/7/25, urine cx (+) 7/6/25. Pt admitted w/ SOB. CTA shows chronic PE. NEW eliquis. Hx breast cancer, asthma, Hep C. Per surgery notes outpt for mammogram and US bilateral breast. Room air. Discharge order noted. New medications sent to [facility]. Discharge plan is home and pt declines HHC or any other needs. Pt lives alone on 4th floor of [address] w/elevator access. She has Passport Waiver provided by [facility]. She has declined HHA services & receives ERS & meals delivered. Her DME includes a rollator, WW, cane, shower chair. Pts sister to transport home. Electronically signed by Brianne Mckeon RN on 7/10/2025 at 11:40 AM

## 2025-07-10 NOTE — PROGRESS NOTES
**ATTENTION BEDSIDE RN**    Please copy below template, fill in appropriate fields, and place in a progress note.    Testing MUST be completed within but no later than 48 hours of discharge.     Please ambulate patient for a MINIMUM of 6 minutes to ensure accuracy of test.      Pulse ox was __97_____ % on room air at rest.  Ambulated patient on room air.    Oxygen saturation was ___97____ % on room air while ambulating. (lowest saturation reached)        
4 Eyes Skin Assessment     NAME:  Carla Felix  YOB: 1945  MEDICAL RECORD NUMBER:  [id number]    The patient is being assessed for  Admission    I agree that at least one RN has performed a thorough Head to Toe Skin Assessment on the patient. ALL assessment sites listed below have been assessed.      Areas assessed by both nurses:    Head, Face, Ears, Shoulders, Back, Chest, Arms, Elbows, Hands, Sacrum. Buttock, Coccyx, Ischium, Legs. Feet and Heels, and Under Medical Devices         Does the Patient have a Wound? No noted wound(s)       Joe Prevention initiated by RN: N/A  Wound Care Orders initiated by RN: N/A    Pressure Injury (Stage 1,2,3,4, Unstageable, DTI, NWPT, and Complex wounds) if present, place Wound referral order by RN under : No    New Ostomies, if present place, Ostomy referral order under : No     Nurse 1 eSignature: Electronically signed by Concha Remy RN on 7/7/25 at 2:24 PM EDT    **SHARE this note so that the co-signing nurse can place an eSignature**    Nurse 2 eSignature: Electronically signed by Naye Huitron RN on 7/7/25 at 3:28 PM EDT    
Internal Medicine Consult Note    [facility]    Ricky Joseph D.O., RAHATI.                    Michael Petty D.O., ADRIANA Mims D.O.     Justin Bansal, MSN, APRN-CNP  Alexys Villar, MSN, APRN-CNP  Jennifer Ramos, MSN APRN-CNP  Mira Lawler, MSN. APRN-NP-C     Primary Care Physician: Ty Panda MD   Admitting Physician:  Ricky Joseph DO  Admission date and time: 7/6/2025  3:13 PM    Room:  [address] [place] [address]  Admitting diagnosis: PE (pulmonary thromboembolism) (HCC) [I26.99]  Pulmonary embolism without acute cor pulmonale, unspecified chronicity, unspecified pulmonary embolism type (HCC) [I26.99]    Patient Name: Carla Felix  MRN: [id number]    Date of Service: 7/9/2025     Subjective:  Carla is a 79 y.o. female who was seen and examined today,7/9/2025, at the bedside.  Patient resting comfortably.  She denies any nausea this morning.  She stated that she did have a very large bowel movement yesterday evening.  She is reporting some dizziness when turning quickly.  Patient states this happens every now and then.  Patient probably be discharged tomorrow.    No family present during my examination.    Review of System:   Constitutional:   Denies fever or chills, weight loss or gain, fatigue or malaise.  HEENT:   Denies ear pain, sore throat, sinus or eye problems.  Cardiovascular:   Denies any chest pain, irregular heartbeats, or palpitations.   Respiratory:   Improved shortness of breath  Gastrointestinal:   Denies vomiting, diarrhea.  Denies any abdominal pain.  Reports nausea and constipation  Genitourinary:    Denies any urgency, frequency, hematuria. Voiding  without difficulty.  Extremities:   Denies lower extremity swelling, edema or cyanosis.   Neurology:    Denies any headache or focal neurological deficits, Denies generalized weakness or memory difficulty.   Psch:   Denies being anxious or depressed.  Musculoskeletal:  
Internal Medicine Consult Note    [facility]    Ricky Joseph D.O., RAHATI.                    Michael Petty D.O., ADRIANA Mims D.O.     Justin Bansal, MSN, APRN-CNP  Alexys Villar, MSN, APRN-CNP  Jennifer Ramos, MSN APRN-CNP  Mira Lawler, MSN. APRN-NP-C     Primary Care Physician: Ty Panda MD   Admitting Physician:  Ricky Joseph DO  Admission date and time: 7/6/2025  3:13 PM    Room:  [address] [place] [address]  Admitting diagnosis: PE (pulmonary thromboembolism) (HCC) [I26.99]  Pulmonary embolism without acute cor pulmonale, unspecified chronicity, unspecified pulmonary embolism type (HCC) [I26.99]    Patient Name: Carla Felix  MRN: [id number]    Date of Service: 7/7/2025     Subjective:  Carla is a 79 y.o. female who was seen and examined today,7/7/2025, at the bedside.  Patient resting company at the present time.  Patient had minimal swelling of the lower extremities.  Patient currently on room air and receiving Lovenox patient smokes pack cigarettes will last for 4 days.  Denies chest pain.  Patient denies any hemoptysis    No family present during my examination.    Review of System:   Constitutional:   Denies fever or chills, weight loss or gain, fatigue or malaise.  HEENT:   Denies ear pain, sore throat, sinus or eye problems.  Cardiovascular:   Denies any chest pain, irregular heartbeats, or palpitations.   Respiratory:   Improved shortness of breath  Gastrointestinal:   Denies nausea, vomiting, diarrhea, or constipation.  Denies any abdominal pain.  Genitourinary:    Denies any urgency, frequency, hematuria. Voiding  without difficulty.  Extremities:   Denies lower extremity swelling, edema or cyanosis.   Neurology:    Denies any headache or focal neurological deficits, Denies generalized weakness or memory difficulty.   Psch:   Denies being anxious or depressed.  Musculoskeletal:    Denies  myalgias, joint complaints or back 
Physical Therapy Initial Evaluation/Plan of Care    Room #:  0515/0515-02  Patient Name: Carla Felix  YOB: 1945  MRN: [id number]    Date of Service: 7/9/2025     Tentative placement recommendation: Home with Home Health Physical Therapy  Equipment recommendation: Patient has needed equipment       Evaluating Physical Therapist: MUKESH Pineda      Specific Provider Orders/Date/Referring Provider :    PT eval and treat  Start:  07/08/25 0845,   End:  07/08/25 0845,   ONE TIME,   Standing Count:  1 Occurrences,   R       Ricky Joseph DO    Admitting Diagnosis:   PE (pulmonary thromboembolism) (HCC) [I26.99]  Pulmonary embolism without acute cor pulmonale, unspecified chronicity, unspecified pulmonary embolism type (HCC) [I26.99]    Admitted with  shortness of breath, cough, dizziness, weakness and above    Surgery: none  Visit Diagnoses         Codes      Pulmonary embolism without acute cor pulmonale, unspecified chronicity, unspecified pulmonary embolism type (HCC)    -  Primary I26.99      Shortness of breath     R06.02            Patient Active Problem List   Diagnosis    Lumbar radiculopathy    Headache    Hereditary and idiopathic peripheral neuropathy    Disc displacement, lumbar    Tension headache    Thoracic or lumbosacral neuritis or radiculitis, unspecified    Displacement of lumbar intervertebral disc without myelopathy    Muscle contraction headache    Cervical disc displacement    Cervical radiculopathy    Cervical osteophyte    Spinal stenosis in cervical region    Vertebrobasilar TIAs    PE (pulmonary thromboembolism) (HCC)        ASSESSMENT of Current Deficits Patient exhibits decreased strength and endurance impairing functional mobility, transfers, gait , gait distance, and tolerance to activity. Patient is cooperative and follows directions. Patient is in independent is all bed mobility. Patient has impaired vision in right eye, required verbal cueing for 
Spiritual Health History and Assessment/Progress Note  TONA  Thomas Contreras    (P) Follow-up,  ,  ,      Name: Carla Felix MRN: [id number]    Age: 79 y.o.     Sex: female   Language: English   Zoroastrian: Anabaptist   PE (pulmonary thromboembolism) ([facility])     Date: 7/10/2025                           Spiritual Assessment continued in [facility] PIC/ICU        Referral/Consult From: (P) Rounding   Encounter Overview/Reason: (P) Follow-up  Service Provided For: (P) Patient    Ana, Belief, Meaning:   Patient is connected with a ana tradition or spiritual practice  Family/Friends No family/friends present      Importance and Influence:  Patient has spiritual/personal beliefs that influence decisions regarding their health  Family/Friends No family/friends present    Community:  Patient is connected with a spiritual community  Family/Friends No family/friends present    Assessment and Plan of Care:     Patient Interventions include: Facilitated expression of thoughts and feelings  Family/Friends Interventions include: No family/friends present    Patient Plan of Care: Spiritual Care available upon further referral  Family/Friends Plan of Care: No family/friends present    Electronically signed by Chaplain Dia on 7/10/2025 at 10:56 AM   
Spiritual Health History and Assessment/Progress Note  [facility]    (P) Spiritual/Emotional Needs, Advance Care Planning,  ,  ,      Name: Carla Felix MRN: [id number]    Age: 79 y.o.     Sex: female   Language: English   Mandaeism: Episcopal   PE (pulmonary thromboembolism) (HCC)     Date: 7/9/2025                           Spiritual Assessment began in [facility] PIC/ICU        Referral/Consult From: Rounding   Encounter Overview/Reason: (P) Spiritual/Emotional Needs, Advance Care Planning  Service Provided For: Patient    Ana, Belief, Meaning:   Patient identifies as spiritual and has beliefs or practices that help with coping during difficult times  Family/Friends No family/friends present      Importance and Influence:  Patient has spiritual/personal beliefs that influence decisions regarding their health  Family/Friends No family/friends present    Community:  Patient feels well-supported. Support system includes: Extended family  Family/Friends No family/friends present    Assessment and Plan of Care:     Patient Interventions include: Facilitated expression of thoughts and feelings, Explored spiritual coping/struggle/distress, Affirmed coping skills/support systems, and Assisted in Advance Care Planning conversation  Patient not interested in completing documents.  Family/Friends Interventions include: No family/friends present    Patient Plan of Care: Spiritual Care available upon further referral  Family/Friends Plan of Care: No family/friends present    Electronically signed by GENA Perales on 7/9/2025 at 10:29 AM   
Additional Info:  Goal:   RUE  WFL 4/5 good  and wfl FMC/dexterity noted during ADL tasks   Improve overall RUE strength  for participation in functional tasks   LUE WFL 4/5 good  and wfl FMC/dexterity noted during ADL tasks   Improve overall LUE strength  for participation in functional tasks      Vitals:  HR at rest: 99  SpO2 at rest: 99%   HR with activity: 115 SpO2 with activity: 96-98%     Hearing: WFL   Sensation:  No c/o numbness or tingling  Tone: WFL   Edema: None    Comments: Upon arrival the patient was seated edge of bed.  At end of session, patient was seated in chair with call light and phone within reach, all lines and tubes intact.  Overall patient demonstrated decreased independence and safety during completion of ADL/functional transfer/mobility tasks.  Pt would benefit from continued skilled OT to increase safety and independence with completion of ADL/IADL tasks for functional independence and quality of life.    Treatment: OT treatment provided this date includes:    Postural Balance: Sitting/standing balance retraining to improve righting reactions with postural changes during ADLs.  Instruction/training on safe functional transfers with verbal cues for hand and foot placement throughout session to improve safety.   Instruction/training in walker sequence, verbal cues for hand and foot placement.  Instruction/training on energy conservation to improve endurance during graded activity to decreased the amount of energy and or effort needed to accomplish the task.   Instruction/training in proper breathing techniques during activities, pursed lip breathing used to decreased shortness of breath while improving O2 saturation.   Instruction/training in bathroom safety with verbal cues to use grab bar for safe commode transfer.   Pt stood at sink level to increased dynamic standing balance while completing activities of daily living.    Rehab Potential: Good for established goals.      Patient 
comorbid conditions. Carla does  voice a willingness to quit smoking and we have not set a quit date.  We have discussed potential methods for smoking cessation including nicotine replacement via patch, gum or lozenge, behavioral and lifestyle modifications, and follow-up with primary care provider for consideration of medications for smoking cessation as well.  We have also discussed additional resources such as [facility] site for additional information, quitassist.com, and [employer].  We have discussed the arrangement of follow-up with primary care provider to discuss progress as well as the potential institution of medications if required/desired.  The amount of time spent counseling the patient directly regarding smoking cessation is greater than 10 minutes.       More than 50% of my  time was spent at the bedside counseling/coordinating care with the patient and/or family with face to face contact.  This time was spent reviewing notes and laboratory data as well as instructing and counseling the patient. Time I spent with the family or surrogate(s) is included only if the patient was incapable of providing the necessary information or participating in medical decisions. I also discussed the differential diagnosis and all of the proposed management plans with the patient and individuals accompanying the patient.    The patient was seen, examined and then discussed with Dr. Joseph.      STEVE Rodriguez CNP  7/8/2025  6:18 PM       I saw and evaluated the patient. I agree with the findings and the plan of care as documented in Alexys JENNINGS note.    Ricky Joseph DO, FACOI  6:33 PM  7/8/2025

## 2025-07-10 NOTE — PLAN OF CARE
Problem: Discharge Planning  Goal: Discharge to home or other facility with appropriate resources  7/10/2025 0200 by Michelle Roman RN  Outcome: Progressing  7/9/2025 1243 by Deandra Hines RN  Outcome: Progressing  Flowsheets (Taken 7/9/2025 0818)  Discharge to home or other facility with appropriate resources: Identify barriers to discharge with patient and caregiver     Problem: Safety - Adult  Goal: Free from fall injury  7/10/2025 0200 by Michelle Roman RN  Outcome: Progressing  7/9/2025 1243 by Deandra Hines RN  Outcome: Progressing  Flowsheets (Taken 7/9/2025 1115)  Free From Fall Injury: Instruct family/caregiver on patient safety     Problem: ABCDS Injury Assessment  Goal: Absence of physical injury  7/10/2025 0200 by Michelle Roman RN  Outcome: Progressing  7/9/2025 1243 by Deandra Hines RN  Outcome: Progressing  Flowsheets (Taken 7/9/2025 1115)  Absence of Physical Injury: Implement safety measures based on patient assessment     Problem: Skin/Tissue Integrity  Goal: Skin integrity remains intact  Description: 1.  Monitor for areas of redness and/or skin breakdown  2.  Assess vascular access sites hourly  3.  Every 4-6 hours minimum:  Change oxygen saturation probe site  4.  Every 4-6 hours:  If on nasal continuous positive airway pressure, respiratory therapy assess nares and determine need for appliance change or resting period  7/10/2025 0200 by Michelle Roman RN  Outcome: Progressing  7/9/2025 1243 by Deandra Hines RN  Outcome: Progressing

## 2025-07-10 NOTE — DISCHARGE INSTR - COC
Bearin}  Other Medical Equipment (for information only, NOT a DME order):  {EQUIPMENT:433520940}  Other Treatments: ***    Patient's personal belongings (please select all that are sent with patient):  {CHP DME Belongings:428494911}    RN SIGNATURE:  {Esignature:[id number]}    CASE MANAGEMENT/SOCIAL WORK SECTION    Inpatient Status Date: ***    Readmission Risk Assessment Score:  [facility] RISK OF UNPLANNED READMISSION 2.0             14.6 Total Score        Discharging to Facility/ Agency   Name:   Address:  Phone:  Fax:    Dialysis Facility (if applicable)   Name:  Address:  Dialysis Schedule:  Phone:  Fax:    / signature: {Esignature:[id number]}    PHYSICIAN SECTION    Prognosis: {Prognosis:0376041924}    Condition at Discharge: { Patient Condition:873658242}    Rehab Potential (if transferring to Rehab): {Prognosis:0948414081}    Recommended Labs or Other Treatments After Discharge: ***    Physician Certification: I certify the above information and transfer of Carla Felix  is necessary for the continuing treatment of the diagnosis listed and that she requires {Admit to Appropriate Level of Care:80800} for {GREATER/LESS:921802778} 30 days.     Update Admission H&P: {CHP DME Changes in HandP:330063576}    PHYSICIAN SIGNATURE:  {Esignature:482531175}

## 2025-07-11 NOTE — DISCHARGE SUMMARY
[place] [id number]                            DISCHARGE SUMMARY      PATIENT NAME: SHERINE SORIANO          : 1945  MED REC NO: [id number]                        ROOM: 0515  ACCOUNT NO: [id number]                       ADMIT DATE: 2025  PROVIDER: Ricky Joseph DO      ADMITTING DIAGNOSES:  CTA showing a linear filling defect in the right lower lobe, segmental pulmonary embolism compatible with chronic pulmonary embolism.    SECONDARY DISCHARGE DIAGNOSES:  Mucus plug in the right lower lobe bronchus, complicated by underlying chronic obstructive pulmonary disease with history of nicotine use, mild dilatation of pulmonary trunk to 3.2 cm in the ascending and chronic pulmonary arterial hypertension, soft tissue abnormality of the prior mastectomy on the left breast, history of asthma, history of hepatitis C, peripheral neuropathy, gastroesophageal reflux disease, hypertension, current tobacco use.    COMPLICATIONS:  There were no complications.    OPERATIONS:  No operations.    CONSULTATIONS OBTAINED:  With Dr. Swan.  No OMT was given.    ADDITIONAL ADMITTING PHYSICIAN:  Dr. Petty.    CHIEF COMPLAINT AND HISTORY OF CHIEF COMPLAINT:  Pleasant 79-year-old  female who was admitted to [facility].  The patient presented to the hospital here on 2025.  The patient presented to the hospital with increased amount of shortness of breath and difficulty breathing.  The patient did have generalized body aches.  The patient presented to the hospital where abnormal CAT scan at this time, which did show possible chronic pulmonary embolism.  The patient was admitted to hospital at this time.  The patient also has history of COPD and asthma.    PAST MEDICAL HISTORY:  Positive for usual childhood diseases, history of asthma, back pain, cancer of the breast, essential hypertension, liver disease,

## 2025-08-04 ENCOUNTER — INITIAL CONSULT (OUTPATIENT)
Dept: SURGERY | Age: 80
End: 2025-08-04
Payer: MEDICAID

## 2025-08-04 VITALS
DIASTOLIC BLOOD PRESSURE: 83 MMHG | HEIGHT: 61 IN | HEART RATE: 59 BPM | WEIGHT: 145 LBS | SYSTOLIC BLOOD PRESSURE: 153 MMHG | TEMPERATURE: 98.6 F | RESPIRATION RATE: 18 BRPM | BODY MASS INDEX: 27.38 KG/M2

## 2025-08-04 DIAGNOSIS — R92.8 ABNORMAL FINDING ON RADIOLOGICAL EXAMINATION OF BREAST: ICD-10-CM

## 2025-08-04 DIAGNOSIS — K62.5 RECTAL BLEEDING: Primary | ICD-10-CM

## 2025-08-04 PROCEDURE — 1123F ACP DISCUSS/DSCN MKR DOCD: CPT | Performed by: SURGERY

## 2025-08-04 PROCEDURE — 99203 OFFICE O/P NEW LOW 30 MIN: CPT | Performed by: SURGERY

## 2025-08-04 RX ORDER — POLYETHYLENE GLYCOL 3350, SODIUM SULFATE ANHYDROUS, SODIUM BICARBONATE, SODIUM CHLORIDE, POTASSIUM CHLORIDE 236; 22.74; 6.74; 5.86; 2.97 G/4L; G/4L; G/4L; G/4L; G/4L
4 POWDER, FOR SOLUTION ORAL ONCE
Qty: 4000 ML | Refills: 0 | Status: SHIPPED | OUTPATIENT
Start: 2025-08-04 | End: 2025-08-04

## 2025-08-12 ENCOUNTER — TELEPHONE (OUTPATIENT)
Dept: CARDIOLOGY CLINIC | Age: 80
End: 2025-08-12

## 2025-08-20 RX ORDER — ASPIRIN 81 MG/1
TABLET ORAL
Qty: 180 TABLET | Refills: 3 | Status: SHIPPED | OUTPATIENT
Start: 2025-08-20

## 2025-08-22 ENCOUNTER — TELEPHONE (OUTPATIENT)
Dept: CARDIOLOGY CLINIC | Age: 80
End: 2025-08-22

## 2025-09-02 DIAGNOSIS — R92.8 ABNORMAL FINDING ON RADIOLOGICAL EXAMINATION OF BREAST: Primary | ICD-10-CM

## 2025-09-03 ENCOUNTER — HOSPITAL ENCOUNTER (OUTPATIENT)
Dept: MAMMOGRAPHY | Age: 80
Discharge: HOME | End: 2025-09-05
Attending: SURGERY
Payer: MEDICAID

## 2025-09-03 ENCOUNTER — HOSPITAL ENCOUNTER (OUTPATIENT)
Dept: ULTRASOUND IMAGING | Age: 80
Discharge: HOME | End: 2025-09-03
Attending: SURGERY
Payer: MEDICAID

## 2025-09-03 DIAGNOSIS — R92.8 ABNORMAL FINDING ON RADIOLOGICAL EXAMINATION OF BREAST: ICD-10-CM

## 2025-09-03 PROCEDURE — G0279 TOMOSYNTHESIS, MAMMO: HCPCS

## 2025-09-03 PROCEDURE — 76642 ULTRASOUND BREAST LIMITED: CPT
